# Patient Record
Sex: MALE | Race: WHITE | NOT HISPANIC OR LATINO | URBAN - METROPOLITAN AREA
[De-identification: names, ages, dates, MRNs, and addresses within clinical notes are randomized per-mention and may not be internally consistent; named-entity substitution may affect disease eponyms.]

---

## 2017-10-24 ENCOUNTER — EMERGENCY (EMERGENCY)
Facility: HOSPITAL | Age: 47
LOS: 0 days | Discharge: HOME | End: 2017-10-24
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
## 2017-10-24 DIAGNOSIS — I10 ESSENTIAL (PRIMARY) HYPERTENSION: ICD-10-CM

## 2017-10-24 DIAGNOSIS — M54.5 LOW BACK PAIN: ICD-10-CM

## 2017-10-24 DIAGNOSIS — R42 DIZZINESS AND GIDDINESS: ICD-10-CM

## 2017-10-24 DIAGNOSIS — Z87.891 PERSONAL HISTORY OF NICOTINE DEPENDENCE: ICD-10-CM

## 2017-10-24 DIAGNOSIS — S39.012A STRAIN OF MUSCLE, FASCIA AND TENDON OF LOWER BACK, INITIAL ENCOUNTER: ICD-10-CM
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
## 2017-10-24 DIAGNOSIS — Z79.899 OTHER LONG TERM (CURRENT) DRUG THERAPY: ICD-10-CM

## 2020-11-20 ENCOUNTER — INPATIENT (INPATIENT)
Facility: HOSPITAL | Age: 50
LOS: 13 days | Discharge: HOME | End: 2020-12-04
Attending: PSYCHIATRY & NEUROLOGY | Admitting: PSYCHIATRY & NEUROLOGY
Payer: COMMERCIAL

## 2020-11-20 VITALS
OXYGEN SATURATION: 98 % | SYSTOLIC BLOOD PRESSURE: 136 MMHG | DIASTOLIC BLOOD PRESSURE: 96 MMHG | WEIGHT: 231.93 LBS | HEART RATE: 123 BPM | TEMPERATURE: 98 F | RESPIRATION RATE: 20 BRPM

## 2020-11-20 DIAGNOSIS — I10 ESSENTIAL (PRIMARY) HYPERTENSION: ICD-10-CM

## 2020-11-20 DIAGNOSIS — E78.00 PURE HYPERCHOLESTEROLEMIA, UNSPECIFIED: ICD-10-CM

## 2020-11-20 DIAGNOSIS — F41.9 ANXIETY DISORDER, UNSPECIFIED: ICD-10-CM

## 2020-11-20 DIAGNOSIS — F41.0 PANIC DISORDER [EPISODIC PAROXYSMAL ANXIETY]: ICD-10-CM

## 2020-11-20 DIAGNOSIS — F32.2 MAJOR DEPRESSIVE DISORDER, SINGLE EPISODE, SEVERE WITHOUT PSYCHOTIC FEATURES: ICD-10-CM

## 2020-11-20 LAB
ALBUMIN SERPL ELPH-MCNC: 5 G/DL — SIGNIFICANT CHANGE UP (ref 3.5–5.2)
ALP SERPL-CCNC: 97 U/L — SIGNIFICANT CHANGE UP (ref 30–115)
ALT FLD-CCNC: 40 U/L — SIGNIFICANT CHANGE UP (ref 0–41)
AMPHET UR-MCNC: NEGATIVE — SIGNIFICANT CHANGE UP
ANION GAP SERPL CALC-SCNC: 12 MMOL/L — SIGNIFICANT CHANGE UP (ref 7–14)
APAP SERPL-MCNC: <5 UG/ML — LOW (ref 10–30)
AST SERPL-CCNC: 30 U/L — SIGNIFICANT CHANGE UP (ref 0–41)
BARBITURATES UR SCN-MCNC: NEGATIVE — SIGNIFICANT CHANGE UP
BASOPHILS # BLD AUTO: 0.04 K/UL — SIGNIFICANT CHANGE UP (ref 0–0.2)
BASOPHILS NFR BLD AUTO: 0.3 % — SIGNIFICANT CHANGE UP (ref 0–1)
BENZODIAZ UR-MCNC: POSITIVE
BILIRUB SERPL-MCNC: 1 MG/DL — SIGNIFICANT CHANGE UP (ref 0.2–1.2)
BUN SERPL-MCNC: 21 MG/DL — HIGH (ref 10–20)
CALCIUM SERPL-MCNC: 10 MG/DL — SIGNIFICANT CHANGE UP (ref 8.5–10.1)
CHLORIDE SERPL-SCNC: 106 MMOL/L — SIGNIFICANT CHANGE UP (ref 98–110)
CO2 SERPL-SCNC: 21 MMOL/L — SIGNIFICANT CHANGE UP (ref 17–32)
COCAINE METAB.OTHER UR-MCNC: NEGATIVE — SIGNIFICANT CHANGE UP
CREAT SERPL-MCNC: 1.3 MG/DL — SIGNIFICANT CHANGE UP (ref 0.7–1.5)
DRUG SCREEN 1, URINE RESULT: SIGNIFICANT CHANGE UP
EOSINOPHIL # BLD AUTO: 0.01 K/UL — SIGNIFICANT CHANGE UP (ref 0–0.7)
EOSINOPHIL NFR BLD AUTO: 0.1 % — SIGNIFICANT CHANGE UP (ref 0–8)
ETHANOL SERPL-MCNC: <10 MG/DL — SIGNIFICANT CHANGE UP
GLUCOSE SERPL-MCNC: 118 MG/DL — HIGH (ref 70–99)
HCT VFR BLD CALC: 50.8 % — SIGNIFICANT CHANGE UP (ref 42–52)
HGB BLD-MCNC: 17.8 G/DL — SIGNIFICANT CHANGE UP (ref 14–18)
IMM GRANULOCYTES NFR BLD AUTO: 0.5 % — HIGH (ref 0.1–0.3)
LYMPHOCYTES # BLD AUTO: 1.63 K/UL — SIGNIFICANT CHANGE UP (ref 1.2–3.4)
LYMPHOCYTES # BLD AUTO: 12.9 % — LOW (ref 20.5–51.1)
MCHC RBC-ENTMCNC: 29.7 PG — SIGNIFICANT CHANGE UP (ref 27–31)
MCHC RBC-ENTMCNC: 35 G/DL — SIGNIFICANT CHANGE UP (ref 32–37)
MCV RBC AUTO: 84.7 FL — SIGNIFICANT CHANGE UP (ref 80–94)
METHADONE UR-MCNC: NEGATIVE — SIGNIFICANT CHANGE UP
MONOCYTES # BLD AUTO: 0.61 K/UL — HIGH (ref 0.1–0.6)
MONOCYTES NFR BLD AUTO: 4.8 % — SIGNIFICANT CHANGE UP (ref 1.7–9.3)
NEUTROPHILS # BLD AUTO: 10.24 K/UL — HIGH (ref 1.4–6.5)
NEUTROPHILS NFR BLD AUTO: 81.4 % — HIGH (ref 42.2–75.2)
NRBC # BLD: 0 /100 WBCS — SIGNIFICANT CHANGE UP (ref 0–0)
OPIATES UR-MCNC: NEGATIVE — SIGNIFICANT CHANGE UP
PCP UR-MCNC: NEGATIVE — SIGNIFICANT CHANGE UP
PLATELET # BLD AUTO: 243 K/UL — SIGNIFICANT CHANGE UP (ref 130–400)
POTASSIUM SERPL-MCNC: 4 MMOL/L — SIGNIFICANT CHANGE UP (ref 3.5–5)
POTASSIUM SERPL-SCNC: 4 MMOL/L — SIGNIFICANT CHANGE UP (ref 3.5–5)
PROPOXYPHENE QUALITATIVE URINE RESULT: NEGATIVE — SIGNIFICANT CHANGE UP
PROT SERPL-MCNC: 7.6 G/DL — SIGNIFICANT CHANGE UP (ref 6–8)
RAPID RVP RESULT: SIGNIFICANT CHANGE UP
RBC # BLD: 6 M/UL — SIGNIFICANT CHANGE UP (ref 4.7–6.1)
RBC # FLD: 13.1 % — SIGNIFICANT CHANGE UP (ref 11.5–14.5)
SALICYLATES SERPL-MCNC: <0.3 MG/DL — LOW (ref 4–30)
SARS-COV-2 RNA SPEC QL NAA+PROBE: SIGNIFICANT CHANGE UP
SODIUM SERPL-SCNC: 139 MMOL/L — SIGNIFICANT CHANGE UP (ref 135–146)
THC UR QL: NEGATIVE — SIGNIFICANT CHANGE UP
WBC # BLD: 12.59 K/UL — HIGH (ref 4.8–10.8)
WBC # FLD AUTO: 12.59 K/UL — HIGH (ref 4.8–10.8)

## 2020-11-20 PROCEDURE — 99285 EMERGENCY DEPT VISIT HI MDM: CPT

## 2020-11-20 PROCEDURE — 90792 PSYCH DIAG EVAL W/MED SRVCS: CPT | Mod: 95

## 2020-11-20 RX ORDER — LISINOPRIL 2.5 MG/1
40 TABLET ORAL ONCE
Refills: 0 | Status: COMPLETED | OUTPATIENT
Start: 2020-11-20 | End: 2020-11-20

## 2020-11-20 RX ORDER — ACETAMINOPHEN 500 MG
650 TABLET ORAL EVERY 4 HOURS
Refills: 0 | Status: DISCONTINUED | OUTPATIENT
Start: 2020-11-20 | End: 2020-12-04

## 2020-11-20 RX ORDER — IBUPROFEN 200 MG
400 TABLET ORAL EVERY 8 HOURS
Refills: 0 | Status: DISCONTINUED | OUTPATIENT
Start: 2020-11-20 | End: 2020-12-04

## 2020-11-20 RX ORDER — QUETIAPINE FUMARATE 200 MG/1
50 TABLET, FILM COATED ORAL AT BEDTIME
Refills: 0 | Status: DISCONTINUED | OUTPATIENT
Start: 2020-11-20 | End: 2020-11-21

## 2020-11-20 RX ORDER — CLONAZEPAM 1 MG
1 TABLET ORAL THREE TIMES A DAY
Refills: 0 | Status: DISCONTINUED | OUTPATIENT
Start: 2020-11-20 | End: 2020-11-24

## 2020-11-20 RX ORDER — HALOPERIDOL DECANOATE 100 MG/ML
5 INJECTION INTRAMUSCULAR EVERY 8 HOURS
Refills: 0 | Status: DISCONTINUED | OUTPATIENT
Start: 2020-11-20 | End: 2020-12-04

## 2020-11-20 RX ORDER — DIPHENHYDRAMINE HCL 50 MG
50 CAPSULE ORAL ONCE
Refills: 0 | Status: DISCONTINUED | OUTPATIENT
Start: 2020-11-20 | End: 2020-12-04

## 2020-11-20 RX ORDER — ATORVASTATIN CALCIUM 80 MG/1
40 TABLET, FILM COATED ORAL AT BEDTIME
Refills: 0 | Status: DISCONTINUED | OUTPATIENT
Start: 2020-11-20 | End: 2020-12-04

## 2020-11-20 RX ORDER — HALOPERIDOL DECANOATE 100 MG/ML
5 INJECTION INTRAMUSCULAR ONCE
Refills: 0 | Status: DISCONTINUED | OUTPATIENT
Start: 2020-11-20 | End: 2020-12-04

## 2020-11-20 RX ORDER — QUETIAPINE FUMARATE 200 MG/1
25 TABLET, FILM COATED ORAL EVERY 8 HOURS
Refills: 0 | Status: DISCONTINUED | OUTPATIENT
Start: 2020-11-20 | End: 2020-12-04

## 2020-11-20 RX ADMIN — Medication 1 MILLIGRAM(S): at 22:54

## 2020-11-20 RX ADMIN — LISINOPRIL 40 MILLIGRAM(S): 2.5 TABLET ORAL at 21:49

## 2020-11-20 RX ADMIN — QUETIAPINE FUMARATE 50 MILLIGRAM(S): 200 TABLET, FILM COATED ORAL at 22:54

## 2020-11-20 RX ADMIN — ATORVASTATIN CALCIUM 40 MILLIGRAM(S): 80 TABLET, FILM COATED ORAL at 22:54

## 2020-11-20 NOTE — ED PROVIDER NOTE - PHYSICAL EXAMINATION
CONST: Pt appears anxious  EYES: Sclera and conjunctiva clear.   ENT: No nasal discharge. Oropharynx normal appearing, no erythema or exudates  NECK: Non-tender, no meningeal signs. normal ROM. supple   CARD: S1 S2; No jvd  RESP: Equal BS B/L, No wheezes, rhonchi or rales. No distress  GI: Soft, non-tender, non-distended. no cva tenderness. normal BS  MS: Normal ROM in all extremities. pulses 2 +. no calf tenderness or swelling  SKIN: Warm, dry, no acute rashes. Good turgor  NEURO: A&Ox3, No focal deficits. Strength 5/5 with no sensory deficits.

## 2020-11-20 NOTE — H&P ADULT - NSHPLABSRESULTS_GEN_ALL_CORE
17.8   12.59 )-----------( 243      ( 20 Nov 2020 15:15 )             50.8       11-20    139  |  106  |  21<H>  ----------------------------<  118<H>  4.0   |  21  |  1.3    Ca    10.0      20 Nov 2020 15:15    TPro  7.6  /  Alb  5.0  /  TBili  1.0  /  DBili  x   /  AST  30  /  ALT  40  /  AlkPhos  97  11-20                      Lactate Trend            CAPILLARY BLOOD GLUCOSE

## 2020-11-20 NOTE — ED BEHAVIORAL HEALTH ASSESSMENT NOTE - OTHER PAST PSYCHIATRIC HISTORY (INCLUDE DETAILS REGARDING ONSET, COURSE OF ILLNESS, INPATIENT/OUTPATIENT TREATMENT)
two prior suicide attempts and psychiatric hospitalizations, one in the 1990's and one in 2010    currently sees dr brown (psychiatrist) and dr sanchez (therapist)

## 2020-11-20 NOTE — ED BEHAVIORAL HEALTH ASSESSMENT NOTE - SUBSTANCE USE
C/o redness to both eyes onset yesterday with \"goopy\" drainage.    Denies feeling of FB       None known

## 2020-11-20 NOTE — ED BEHAVIORAL HEALTH ASSESSMENT NOTE - HPI (INCLUDE ILLNESS QUALITY, SEVERITY, DURATION, TIMING, CONTEXT, MODIFYING FACTORS, ASSOCIATED SIGNS AND SYMPTOMS)
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   Patient appears quite anxious on exam . he states he was at his usual state of health until the week of the election, when anxiety surrounding the election and a work project led to him "losing it," he has had severe panic attacks daily, with depression, poor/no sleep, inability to concentrate, hopelessness, guilty feelings that he is letting his family down, and passive suicidal ideation. he has two prior suicide attempts, by ingesting pills and drinking [employer] fluid. pt unalbe to contract for safety on interview, he fears he is not safe.  denies manic/psychotic sx s or substance abuse.  see  note for collateral from wife: she says pt has not been at his baseline, severely anxious and depressed and unable to function, she is quite concerned   reports he sees a dr sanchez (therapist) and dr brown , psychiatrist. takes seroque 400mg daily, and klonopin recently increased to 3mg daily  ISTOP  11/18/2020 11/18/2020 clonazepam 1 mg tablet  90 30 Paradise Silver MD [facility] [id number]   10/14/2020 10/22/2020 clonazepam 1 mg tablet  60 30 Paradise Silver MD [facility] [id number]

## 2020-11-20 NOTE — ED BEHAVIORAL HEALTH ASSESSMENT NOTE - DETAILS
two children, twins age 12 two prior suicide attempts. current passive suicidal ideation maternal aunt - suicide attempt Mother - alzheimers father - etoh self d/w ED provider

## 2020-11-20 NOTE — ED BEHAVIORAL HEALTH NOTE - BEHAVIORAL HEALTH NOTE
pt reassessed by telepsychiatry    pt presents anxious but cooperative. he states that he feels somewhat less anxious since being in the ER. he confirmed hx that he shared with previous provider. stated that the election was a trigger for his anxiety and it just spiraled. he states that he would become confused at times and think that he didn't have insurance for his family and then feel guilty that he was not providing for them.   he denies any AVH and any paranoid delusions.   he at this time he denies any active or passive SI. no aggressive thoughts.      Plan  9.13 admission to [facility]  no need for 1:1  meds: he confirmed home medications- still discrepancy between the dose of seroquel. agree with other psychiatrist to give low dose for tonight until dose can  be confirmed in the morning with pharmacy or his psychiatrist. will continue klonopin but space it out to 1mg TID to manage daytime anxiety (pt takes all 3 mg at night). seroquel 25mg can be used as a PRN for now since pt reports taking higher doses. for acute agitation can give PRNS: haldol 5mg, ativan 2mg, diphenhydramine 50mg, PO/IM, Q6H for Agitation   in terms of medical meds: cont current meds benazepril 40mg qd; atorvastatin 40mg qd. /93 so discussed with EM attending ot give BP med now and recheck VS prior to going up to the unit. pt reassessed by telepsychiatry    pt presents anxious but cooperative. he states that he feels somewhat less anxious since being in the ER. he confirmed hx that he shared with previous provider. stated that the election was a trigger for his anxiety and it just spiraled. he states that he would become confused at times and think that he didn't have insurance for his family and then feel guilty that he was not providing for them.   he denies any AVH and any paranoid delusions.   he at this time he denies any active or passive SI. no aggressive thoughts.      Plan  9.13 admission to [facility]  no need for 1:1  meds: he confirmed home medications- still discrepancy between the dose of seroquel. agree with other psychiatrist to give low dose for tonight until dose can  be confirmed in the morning with pharmacy or his psychiatrist. will continue klonopin but space it out to 1mg TID to manage daytime anxiety (pt takes all 3 mg at night). seroquel 25mg can be used as a PRN for now since pt reports taking higher doses. for acute agitation can give PRNS: haldol 5mg, ativan 2mg, diphenhydramine 50mg, PO/IM, Q6H for Agitation   in terms of medical meds: cont current meds benazepril 40mg qd; atorvastatin 40mg qd. /93 so discussed with EM attending ot give BP med now and recheck VS prior to going up to the unit.  handoff given to BASHIR Gustafson

## 2020-11-20 NOTE — ED BEHAVIORAL HEALTH ASSESSMENT NOTE - RISK ASSESSMENT
high acute risk: severely depressed, hopeless, not sleeping, with passive suicidal ideation   high chronic risk: two prior suicide attempts High Acute Suicide Risk

## 2020-11-20 NOTE — ED BEHAVIORAL HEALTH ASSESSMENT NOTE - SUMMARY
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  pt is severely depressed and anxious, not functioning, not sleeping, with passive suicidal ideation, he has two prior suicide attempts, he is high risk of self harm and would benefit from inpatient hospitalization for safety and stabilization.

## 2020-11-20 NOTE — ED BEHAVIORAL HEALTH ASSESSMENT NOTE - DESCRIPTION
see bh note     COVID Exposure Screen- Patient    1.	*In the past 14 days, have you been around anyone with a positive COVID-19 test?*   (  ) Yes   ( x ) No   (  ) Unknown- Reason (e.g. patient uncertain, sedated, refusing to answer, etc.):  ______  IF YES PROCEED TO QUESTION #2. IF NO or UNKNOWN, PLEASE SKIP TO QUESTION #7  2.	Were you within 6 feet of them for at least 15 minutes? (  ) Yes   (  ) No   (  ) Unknown- Reason: ______    3.	Have you provided care for them? (  ) Yes   (  ) No   (  ) Unknown- Reason: ______    4.	Have you had direct physical contact with them (touched, hugged, or kissed them)?  (  ) Yes   (  ) No    (  ) Unknown- Reason: ______    5.	Have you shared eating or drinking utensils with them? (  ) Yes   (  ) No    (  ) Unknown- Reason: ______    6.	Have they sneezed, coughed, or somehow got respiratory droplets on you? (  ) Yes   (  ) No    (  ) Unknown- Reason: ______      7.	*Have you been out of [place] within the past 14 days?*  (  ) Yes   ( x ) No   (  ) Unknown- Reason (e.g. patient uncertain, sedated, refusing to answer, etc.): _______  IF YES PLEASE ANSWER THE FOLLOWING QUESTIONS:  8.	Which state/country have you been to? ______   9.	Were you there over 24 hours? (  ) Yes   (  ) No    (  ) Unknown- Reason: ______    10.	What date did you return to [facility]? 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

## 2020-11-20 NOTE — ED PROVIDER NOTE - OBJECTIVE STATEMENT
50y M , [facility] as listed presents for eval of anxiety. 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 Pt takes seroqul and klonopin with minimal improvement. Denies si/hi, hallucinations, cp, sob, weakness, numbness, n/v/d/c

## 2020-11-20 NOTE — H&P ADULT - ASSESSMENT
49 y/o male presents anxious but cooperative. he states that he feels somewhat less anxious now  in IPP . He confirmed hx that he shared with previous provider. stated that the election was a trigger for his anxiety and it just spiraled. He states that he would become confused at times and think that he didn't have insurance for his family and then feel guilty that he was not providing for them.     Last week pt spoke to Psychiatrist, and this week Psychiatrist increased pt’s dose of Seroquel and Klonopin. This week in the context of anxiety pt has been stuttering and at times is unable to articulate thoughts. Pt has been rocking in a corner and shaking.

## 2020-11-20 NOTE — ED PROVIDER NOTE - NS ED ROS FT
Constitutional: (-) fever  Eyes/ENT: (-) blurry vision, (-) epistaxis  Cardiovascular: (-) chest pain, (-) syncope  Respiratory: (-) cough, (-) shortness of breath  Gastrointestinal: (-) vomiting, (-) diarrhea  Musculoskeletal: (-) neck pain, (-) back pain, (-) joint pain  Integumentary: (-) rash, (-) edema  Neurological: (-) headache, (-) altered mental status  Psychiatric: (+) anxiety, (-) si/hi, (-) hallucinations  Allergic/Immunologic: (-) pruritus

## 2020-11-20 NOTE — ED BEHAVIORAL HEALTH ASSESSMENT NOTE - PSYCHIATRIC ISSUES AND PLAN (INCLUDE STANDING AND PRN MEDICATION)
cont klonopin 1mg tid; cont seroquel (unclear dose, pt states 400mg but med list states 50mg, would order 50mg qhs for now)

## 2020-11-20 NOTE — ED BEHAVIORAL HEALTH ASSESSMENT NOTE - SUICIDE RISK FACTORS
Anhedonia/Current mood episode/Agitation/Severe Anxiety/Panic/Insomnia/Mood Disorder current/past/Hopelessness or despair

## 2020-11-20 NOTE — ED BEHAVIORAL HEALTH NOTE - BEHAVIORAL HEALTH NOTE
===================  PRE-HOSPITAL COURSE  ===================  SOURCE: ED Documentation, ED RN   DETAILS: Pt was a walk-in    ============  ED COURSE   ============  SOURCE: ED Documentation, ED RN  ARRIVAL: Pt was a walk-in   BELONGINGS: Security secured glasses, wallet; pt provided pill bottles which were sent to Pharmacy   BEHAVIOR: Pt presented with complaint of anxiety, and has been reiterating the details about current stressors. Pt denied SI/HI and denied AH/VH. Pt was agreeable to hospital protocol including changing into a gown and having labs drawn and belongings secured. Pt did not require involuntary medication or security intervention. Eye contact is good initially, but as pt spoke about stressors and feeling overwhelmed pt’s gaze shifted downwards. Pt presents as anxious in ED. Hygiene and grooming is good.   TREATMENT: None  VISITORS: None (Wife dropped pt off at ED and due to COVID regulations returned home)      ========================  FOR EACH COLLATERAL  ========================  NAME: Mariya   NUMBER: Lokesh Whalen preferred- [phone]; Cell [phone]   RELATIONSHIP: Wife  RELIABILITY: Knowledgeable about pt history and presenting issues       HPI  BASELINE FUNCTIONING: Pt is  and domiciled with Wife and two children age 12. Pt has a great relationship with children and is very involved in their lives. 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 Pt has baseline anxiety with hx of panic attacks, which he is typically able to manage with medication and outpatient psychiatric treatment.    DATE HPI STARTED: About 2.5 weeks ago  DECOMPENSATION: Pt and Dad have different political views, and have been arguing about their viewpoints leading up to the election. Dad uses Alcohol and frequently calls pt while intoxicated and yells at pt, at times in an incoherent manner. Pt’s Mom is dx with Alzheimer’s and has been in a Nursing Home, and pt has been unable to visit Mom since March due to COVID pandemic. Pt has face-timed a few times, and Mom hasn’t been able to recognize pt. A few weeks ago pt’s 11yo daughter was exposed to someone who tested positive for COVID, and pt and family quarantined at home and got tested for COVID, and the family members all tested negative. Over the past 2.5 weeks pt has had more frequent and more severe panic attacks. Pt has been having difficulty sleeping and in an attempt to compensate for lack of sleep, pt has been drinking a high quantity of caffeinated beverages. Pt has had decreased appetite and difficulty focusing. Pt has felt overwhelmed and flustered by his job tasks, which are tasks that pt has performed many times in his career.  Last week pt spoke to Psychiatrist, and this week Psychiatrist increased pt’s dose of Seroquel and Klonopin. This week in the context of anxiety pt has been stuttering and at times is unable to articulate thoughts. Pt has been rocking in a corner and shaking. At times pt has felt like he couldn’t move his arms, and described feeling as though the top of his head was hot.  Wife called Psychiatrist today and left a message. Pt at times appeared that he couldn’t move his arms, and felt like when he had to do a screenshare on zoom, it was difficult to move his arms. Pt stated that the top of his head felt hot. Wife denies psychotic symptoms, aggression/violence, substance use, recent SI, self injury or suicide attempt, or access to guns/weapons. Pt went willingly with Wife to ED today. Wife is in support of pt being admitted as she states that pt is far from his baseline and is presenting with the worst anxiety she has observed since 2011 when pt was hospitalized.    SUICIDALITY: No recent SI, but pt has reported feeling overwhelmed and wanting to seek help in order to not decompensate further   VIOLENCE: None  SUBSTANCE: None    ========================  PAST PSYCHIATRIC HISTORY  ========================  DATE PAST PSYCHIATRIC HISTORY STARTED: Extensive hx of anxiety for many years and remote hx of SI about 10 years ago  MAIN PSYCHIATRIC DIAGNOSIS: Anxiety  PSYCHIATRIC HOSPITALIZATIONS: Two Psychiatric hospitalizations. In November 2010 pt was admitted medically at [facility] after a serious Suicide Attempt when pt overdosed on a few hundred pills of Xanax, and ended up in a coma. Pt was later transferred to a rehab facility to regain strength after a long hospitalization, and then was admitted to [facility] inpatient [facility] unit for about 2 weeks.    PRIOR ILLNESS: After pt’s hospitalization for anxiety in 2011, pt has been treated in an outpatient setting by a Psychiatrist and therapist, and has been adherent to medication over the years.   SUICIDALITY: 2 suicide attempts, most recently in November 2010 after overdosing on close to 250 Xanax pills, leading to coma and medical admission to [facility], rehabilitation, and subsequently a 2 week hospitalization at [facility]   VIOLENCE: No  SUBSTANCE USE: None      ==============  OTHER HISTORY  ==============  CURRENT MEDICATION: Per ED Documentation   MEDICAL HISTORY: High Cholesterol, possibly High Blood Pressure  ALLERGIES: No  LEGAL ISSUES: No  FIREARM ACCESS: No  SOCIAL HISTORY: Hx of Dad with Alcohol Use and verbal abuse since childhood. Pt is employed as a teacher and due to COVID pandemic began teaching remote, and has had to get used to the technology that is used.   FAMILY HISTORY: Dad- Alcohol Use, Maternal Aunt- hx of Suicide Attempts, Paternal Aunt- Bipolar Disorder, Mom- Alzheimer’s Disease   DEVELOPMENTAL HISTORY: Unknown    COVID Exposure Screen- collateral (i.e. third-party, chart review, belongings, etc; include EMS and ED staff)    1.	*In the past 14 days, has the patient been around anyone with a positive COVID-19 test?*   (  ) Yes   ( X ) No   (  ) Unknown- Reason (e.g. collateral uncertain, refusing to answer, etc.):  ______  IF YES PROCEED TO QUESTION #2. IF NO or UNKNOWN, PLEASE SKIP TO QUESTION #7  2.	Was the patient within 6 feet of them for at least 15 minutes? (  ) Yes   (  ) No   (  ) Unknown- Reason: ______    3.	Did the patient provide care for them? (  ) Yes   (  ) No   (  ) Unknown- Reason: ______    4.	Did the patient have direct physical contact with them (touched, hugged, or kissed them)? (  ) Yes   (  ) No    (  ) Unknown- Reason: ______    5.	Did the patient share eating or drinking utensils with them? (  ) Yes   (  ) No    (  ) Unknown- Reason: ______    6.	Have they sneezed, coughed, or somehow got respiratory droplets on the patient? (  ) Yes   (  ) No    (  ) Unknown- Reason: ______    7.	*Has the patient been out of [place] within the past 14 days?*  (  ) Yes   ( X) No   (  ) Unknown- Reason (e.g. collateral uncertain, refusing to answer, etc.): _______  IF YES PLEASE ANSWER THE FOLLOWING QUESTIONS:  8.	Which state/country has the patient been to? ______   9.	Was the patient there over 24 hours? (  ) Yes   (  ) No    (  ) Unknown- Reason: ______    10.	What date did the patient return to [facility]? ______

## 2020-11-21 DIAGNOSIS — I10 ESSENTIAL (PRIMARY) HYPERTENSION: ICD-10-CM

## 2020-11-21 DIAGNOSIS — F33.1 MAJOR DEPRESSIVE DISORDER, RECURRENT, MODERATE: ICD-10-CM

## 2020-11-21 LAB
CHOLEST SERPL-MCNC: 153 MG/DL — SIGNIFICANT CHANGE UP
HDLC SERPL-MCNC: 44 MG/DL — SIGNIFICANT CHANGE UP
LIPID PNL WITH DIRECT LDL SERPL: 93 MG/DL — SIGNIFICANT CHANGE UP
NON HDL CHOLESTEROL: 109 MG/DL — SIGNIFICANT CHANGE UP
TRIGL SERPL-MCNC: 98 MG/DL — SIGNIFICANT CHANGE UP

## 2020-11-21 PROCEDURE — 99253 IP/OBS CNSLTJ NEW/EST LOW 45: CPT

## 2020-11-21 PROCEDURE — 99231 SBSQ HOSP IP/OBS SF/LOW 25: CPT

## 2020-11-21 RX ORDER — INFLUENZA VIRUS VACCINE 15; 15; 15; 15 UG/.5ML; UG/.5ML; UG/.5ML; UG/.5ML
0.5 SUSPENSION INTRAMUSCULAR ONCE
Refills: 0 | Status: COMPLETED | OUTPATIENT
Start: 2020-11-21 | End: 2020-12-04

## 2020-11-21 RX ORDER — QUETIAPINE FUMARATE 200 MG/1
100 TABLET, FILM COATED ORAL AT BEDTIME
Refills: 0 | Status: DISCONTINUED | OUTPATIENT
Start: 2020-11-21 | End: 2020-11-22

## 2020-11-21 RX ORDER — LISINOPRIL 2.5 MG/1
40 TABLET ORAL DAILY
Refills: 0 | Status: DISCONTINUED | OUTPATIENT
Start: 2020-11-21 | End: 2020-12-04

## 2020-11-21 RX ORDER — DULOXETINE HYDROCHLORIDE 30 MG/1
90 CAPSULE, DELAYED RELEASE ORAL DAILY
Refills: 0 | Status: DISCONTINUED | OUTPATIENT
Start: 2020-11-21 | End: 2020-12-04

## 2020-11-21 RX ORDER — HYDROXYZINE HCL 10 MG
50 TABLET ORAL EVERY 6 HOURS
Refills: 0 | Status: DISCONTINUED | OUTPATIENT
Start: 2020-11-21 | End: 2020-12-04

## 2020-11-21 RX ADMIN — QUETIAPINE FUMARATE 25 MILLIGRAM(S): 200 TABLET, FILM COATED ORAL at 00:06

## 2020-11-21 RX ADMIN — Medication 1 MILLIGRAM(S): at 20:13

## 2020-11-21 RX ADMIN — QUETIAPINE FUMARATE 100 MILLIGRAM(S): 200 TABLET, FILM COATED ORAL at 20:13

## 2020-11-21 RX ADMIN — HALOPERIDOL DECANOATE 5 MILLIGRAM(S): 100 INJECTION INTRAMUSCULAR at 11:10

## 2020-11-21 RX ADMIN — ATORVASTATIN CALCIUM 40 MILLIGRAM(S): 80 TABLET, FILM COATED ORAL at 20:13

## 2020-11-21 RX ADMIN — Medication 50 MILLIGRAM(S): at 11:09

## 2020-11-21 RX ADMIN — Medication 50 MILLIGRAM(S): at 20:14

## 2020-11-21 RX ADMIN — DULOXETINE HYDROCHLORIDE 90 MILLIGRAM(S): 30 CAPSULE, DELAYED RELEASE ORAL at 14:07

## 2020-11-21 RX ADMIN — Medication 1 MILLIGRAM(S): at 12:35

## 2020-11-21 NOTE — PROGRESS NOTE BEHAVIORAL HEALTH - SUMMARY
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    Anxiety/depression: confirmed with the pharmacy ([facility], [phone]) that Pt has been on duloxetive 90mg daily, Klonopin 1mg TID. Will restart duloxetine 90mg daily. Cont Klonopin. Continue Seroquel 50mg HS     Vistaril 50mg q6h PRN and Seroquel 25mg for breakthrough anxiety.    HTN - lisinopril 40mg qd

## 2020-11-21 NOTE — CHART NOTE - NSCHARTNOTEFT_GEN_A_CORE
I was called by Pts psychiatrist Dr. Medeiros, who suggested to observe Pt more thoroughly due to h/o very serious SA in the past and possibly current SI. I called nurses on IPP and conveyed this information to them. As per Dr Medeiros's suggestion, I also increased Pts Seroquel to 100mg HS.

## 2020-11-21 NOTE — PROGRESS NOTE BEHAVIORAL HEALTH - NSBHFUPINTERVALHXFT_PSY_A_CORE
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   As per nurses report, Pt has been without acute behavioral issues, adherent to treatment, ate breakfast this morning. Pt reports poor sleep last night and feeling tired and anxious this AM. He denies SI but reports a lot of guilt "for leaving my family and disrupting everybody's life". No overt psychotic Sx appreciated.

## 2020-11-21 NOTE — PROGRESS NOTE BEHAVIORAL HEALTH - PRN MEDS
Pt received Seroquel 25mg HS PRN last night  Vistaril 50mg q6h PRN anxiety ordered - Pt received one dose in AM.

## 2020-11-21 NOTE — PROGRESS NOTE BEHAVIORAL HEALTH - NSBHCHARTREVIEWLAB_PSY_A_CORE FT
17.8   12.59 )-----------( 243      ( 20 Nov 2020 15:15 )             50.8   11-20    139  |  106  |  21<H>  ----------------------------<  118<H>  4.0   |  21  |  1.3    Ca    10.0      20 Nov 2020 15:15        TPro  7.6  /  Alb  5.0  /  TBili  1.0  /  DBili  x   /  AST  30  /  ALT  40  /  AlkPhos  97  11-20

## 2020-11-21 NOTE — PROGRESS NOTE BEHAVIORAL HEALTH - NSBHCHARTREVIEWINVESTIGATE_PSY_A_CORE FT
Ventricular Rate 94 BPM    Atrial Rate 94 BPM    P-R Interval 138 ms    QRS Duration 86 ms    Q-T Interval 316 ms    QTC Calculation(Bazett) 395 ms    P Axis 53 degrees    R Axis 38 degrees    T Axis 85 degrees    Diagnosis Line Sinus rhythm with Premature supraventricular complexes  Poor R wave progression    Confirmed by DEBBIE STORY MD ([id number]) on 11/21/2020 9:52:04 AM

## 2020-11-21 NOTE — CONSULT NOTE ADULT - SUBJECTIVE AND OBJECTIVE BOX
51 y/o male with PMH HTN and HLD presents to [facility] for IPP admission.    11/21:  Patient seen at bedside, denies any acute complaints.          REVIEW OF SYSTEMS:  CONSTITUTIONAL: No fever, weight loss, or fatigue  EYES: No eye pain, visual disturbances, or discharge  ENMT:  No difficulty hearing, tinnitus, vertigo; No sinus or throat pain  NECK: No pain or stiffness  RESPIRATORY: No cough, wheezing, chills or hemoptysis; No shortness of breath  CARDIOVASCULAR: No chest pain, palpitations, dizziness, or leg swelling  GASTROINTESTINAL: No abdominal or epigastric pain. No nausea, vomiting, or hematemesis; No diarrhea or constipation. No melena or hematochezia.  GENITOURINARY: No dysuria, frequency, hematuria, or incontinence  NEUROLOGICAL: No headaches, memory loss, loss of strength, numbness, or tremors  SKIN: No itching, burning, rashes, or lesions   LYMPH NODES: No enlarged glands  ENDOCRINE: No heat or cold intolerance; No hair loss  MUSCULOSKELETAL: No joint pain or swelling; No muscle, back, or extremity pain  PSYCHIATRIC: No SI/HI  HEME/LYMPH: No easy bruising, or bleeding gums  ALLERGY AND IMMUNOLOGIC: No hives or eczema      MEDICATIONS  (STANDING):  atorvastatin 40 milliGRAM(s) Oral at bedtime  clonazePAM  Tablet 1 milliGRAM(s) Oral three times a day  influenza   Vaccine 0.5 milliLiter(s) IntraMuscular once  QUEtiapine 50 milliGRAM(s) Oral at bedtime    MEDICATIONS  (PRN):  acetaminophen   Tablet .. 650 milliGRAM(s) Oral every 4 hours PRN Mild Pain (1 - 3)  aluminum hydroxide/magnesium hydroxide/simethicone Suspension 30 milliLiter(s) Oral every 4 hours PRN Dyspepsia  diphenhydrAMINE   Injectable 50 milliGRAM(s) IntraMuscular once PRN Agitation  haloperidol     Tablet 5 milliGRAM(s) Oral every 8 hours PRN agitation  haloperidol    Injectable 5 milliGRAM(s) IntraMuscular once PRN Agitation  hydrOXYzine hydrochloride 50 milliGRAM(s) Oral every 6 hours PRN anxiety  ibuprofen  Tablet. 400 milliGRAM(s) Oral every 8 hours PRN Moderate Pain (4 - 6)  LORazepam   Injectable 2 milliGRAM(s) IntraMuscular once PRN Agitation  QUEtiapine 25 milliGRAM(s) Oral every 8 hours PRN anxiety      Allergies  No Known Allergies            SOCIAL HISTORY: No tobacco, illicit drugs, ETOH abuse    FAMILY HISTORY:  CAD father (43)      Vital Signs Last 24 Hrs  T(C): 36.4 (21 Nov 2020 08:46), Max: 36.4 (20 Nov 2020 14:33)  T(F): 97.5 (21 Nov 2020 08:46), Max: 97.6 (20 Nov 2020 17:09)  HR: 85 (21 Nov 2020 08:46) (85 - 123)  BP: 149/97 (21 Nov 2020 08:46) (136/96 - 173/95)  RR: 18 (21 Nov 2020 08:46) (18 - 20)  SpO2: 98% (20 Nov 2020 20:04) (98% - 98%)    PHYSICAL EXAM:  GENERAL: NAD, well-groomed, well-developed  HEAD:  Atraumatic, Normocephalic  EYES: EOMI, PERRLA, conjunctiva and sclera clear  ENMT: No tonsillar erythema, exudates, or enlargement; Moist mucous membranes, Good dentition, No lesions  NECK: Supple, No JVD, Normal thyroid  NERVOUS SYSTEM:  Alert & Oriented X3, Good concentration; Motor Strength 5/5 B/L upper and lower extremities  CHEST/LUNG: Clear to percussion bilaterally; No rales, rhonchi, wheezing, or rubs  HEART: Regular rate and rhythm; No murmurs, rubs, or gallops  ABDOMEN: Soft, Nontender, Nondistended; Bowel sounds present  EXTREMITIES:  2+ Peripheral Pulses, No clubbing, cyanosis, or edema  SKIN: No rashes or lesions  PSYCH: appropriate mood and behavior      LABS:                        17.8   12.59 )-----------( 243      ( 20 Nov 2020 15:15 )             50.8     11-20    139  |  106  |  21<H>  ----------------------------<  118<H>  4.0   |  21  |  1.3    Ca    10.0      20 Nov 2020 15:15    TPro  7.6  /  Alb  5.0  /  TBili  1.0  /  DBili  x   /  AST  30  /  ALT  40  /  AlkPhos  97  11-20

## 2020-11-22 LAB
SARS-COV-2 IGG SERPL QL IA: NEGATIVE — SIGNIFICANT CHANGE UP
SARS-COV-2 IGM SERPL IA-ACNC: 0.12 INDEX — SIGNIFICANT CHANGE UP

## 2020-11-22 PROCEDURE — 99231 SBSQ HOSP IP/OBS SF/LOW 25: CPT

## 2020-11-22 RX ORDER — QUETIAPINE FUMARATE 200 MG/1
200 TABLET, FILM COATED ORAL AT BEDTIME
Refills: 0 | Status: DISCONTINUED | OUTPATIENT
Start: 2020-11-22 | End: 2020-11-24

## 2020-11-22 RX ADMIN — LISINOPRIL 40 MILLIGRAM(S): 2.5 TABLET ORAL at 08:37

## 2020-11-22 RX ADMIN — QUETIAPINE FUMARATE 200 MILLIGRAM(S): 200 TABLET, FILM COATED ORAL at 20:21

## 2020-11-22 RX ADMIN — Medication 1 MILLIGRAM(S): at 06:07

## 2020-11-22 RX ADMIN — Medication 1 MILLIGRAM(S): at 14:04

## 2020-11-22 RX ADMIN — Medication 1 MILLIGRAM(S): at 20:22

## 2020-11-22 RX ADMIN — ATORVASTATIN CALCIUM 40 MILLIGRAM(S): 80 TABLET, FILM COATED ORAL at 20:22

## 2020-11-22 RX ADMIN — DULOXETINE HYDROCHLORIDE 90 MILLIGRAM(S): 30 CAPSULE, DELAYED RELEASE ORAL at 08:37

## 2020-11-22 NOTE — PROGRESS NOTE BEHAVIORAL HEALTH - NSBHCHARTREVIEWINVESTIGATE_PSY_A_CORE FT
Ventricular Rate 94 BPM    Atrial Rate 94 BPM    P-R Interval 138 ms    QRS Duration 86 ms    Q-T Interval 316 ms    QTC Calculation(Bazett) 395 ms    P Axis 53 degrees    R Axis 38 degrees    T Axis 85 degrees    Diagnosis Line Sinus rhythm with Premature supraventricular complexes  Poor R wave progression    Confirmed by DEBBIE STORY MD ([id number]) on 11/21/2020 9:52:04 AM

## 2020-11-22 NOTE — PROGRESS NOTE BEHAVIORAL HEALTH - NSBHFUPINTERVALHXFT_PSY_A_CORE
Patient reports he has been sleeping better, feels less anxious, and overall more hopeful. He states he is trying to engage in groups on the unit, is more social, and looking forward to returning back to his family. No overt psychotic Sx appreciated. Denies any SI/HI. No other complaints, keeping in touch with his wife.

## 2020-11-22 NOTE — PROGRESS NOTE BEHAVIORAL HEALTH - SUMMARY
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    Anxiety/depression: (Meds were confirmed with the pharmacy [facility] at [facility], [phone] on 11/21/20)  -Continue Duloxetine 90mg daily  -Klonopin 1mg TID  -Increase Seroquel from 100mg to 200 HS (home dose is 400mg)     Vistaril 50mg q6h PRN and Seroquel 25mg for breakthrough anxiety.    HTN - lisinopril 40mg qd

## 2020-11-23 PROCEDURE — 99231 SBSQ HOSP IP/OBS SF/LOW 25: CPT

## 2020-11-23 RX ADMIN — LISINOPRIL 40 MILLIGRAM(S): 2.5 TABLET ORAL at 08:45

## 2020-11-23 RX ADMIN — Medication 1 MILLIGRAM(S): at 15:03

## 2020-11-23 RX ADMIN — Medication 1 MILLIGRAM(S): at 06:42

## 2020-11-23 RX ADMIN — DULOXETINE HYDROCHLORIDE 90 MILLIGRAM(S): 30 CAPSULE, DELAYED RELEASE ORAL at 08:44

## 2020-11-23 NOTE — CHART NOTE - NSCHARTNOTEFT_GEN_A_CORE
Mr. Henry remains on the unit for continued treatment, safety and observation.  met with patient to establish rapport and encourage participation in unit groups. Mr. Henry was pleasant and open to interview. He was open to discussing the details of his admission including experiencing anxiety and panic attacks. 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  listened, providing empathy, support and validation.      reviewed some coping skills Mr. Henry can use in the event of a panic attack, such as taking a break from a remote teaching lesson until his panic attacks dissipates and deep breathing.  encouraged patient to attend groups on the unit to decrease isolation and to review more skills that might help him cope. Mr. Henry Mr. Henry remains on the unit for continued treatment, safety and observation.  met with patient to establish rapport and encourage participation in unit groups. Mr. Henry was pleasant and open to interview. He was open to discussing the details of his admission including experiencing anxiety and panic attacks. 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  listened, providing empathy, support and validation.      reviewed some coping skills Mr. Henry can use in the event of a panic attack, such as taking a break from a remote teaching lesson until his panic attack dissipates and deep breathing.  encouraged patient to attend groups on the unit to decrease isolation and to learn more skills that might help him cope. Mr. Henry was able to attend the evening recovery group with peers, where he was quiet but focused. He did not express any additional concerns.      will continue to meet with Mr. Henry for education, support and assistance with planning for discharge. He will continue to be encouraged to attend groups on the unit, until discharge.

## 2020-11-23 NOTE — PROGRESS NOTE BEHAVIORAL HEALTH - NSBHFUPINTERVALHXFT_PSY_A_CORE
Patient was seen at beside with treatment team. The patient reports feeling drowsy due to receiving Klonopin this morning. He states that he came to the hospital as a result of high anxiety surrounding the election and generally not doing well.     Per MS3 interaction, the patient reports problems sleeping due to recurrent thoughts of long-term hospital stay, getting COVID-19, and  from his wife. He believes his treatment regimen was helping his anxiety before, but not today. . He attributes his drowsiness to Klonopin. Feels "bad for not answering the questions appropriately" during morning rounds. Reports good appetite.  Interested in going back home ASAP and applying for disability. Interested in attending groups. Has been social with other patients in the unit. Has regrets over admission, states "I feel worse in the hospital." Denies being depressed, side effects from medications, or suicidal or homicidal ideation.    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 She believes his sleep problems stem from drinking caffeinated drinks throughout the day.  She expresses concern that he will minimize his symptoms while in the hospital. She states she will visit today.     Left message for [facility], where the patient's outpatient provider (Dr. Ghotra) sees patient.

## 2020-11-23 NOTE — PROGRESS NOTE BEHAVIORAL HEALTH - NSBHCHARTREVIEWINVESTIGATE_PSY_A_CORE FT
< from: 12 Lead ECG (11.20.20 @ 17:20) >  Ventricular Rate 94 BPM  Atrial Rate 94 BPM  P-R Interval 138 ms  QRS Duration 86 ms  Q-T Interval 316 ms  QTC Calculation(Bazett) 395 ms  < end of copied text >

## 2020-11-23 NOTE — MEDICAL STUDENT PROGRESS NOTE(EDUCATION) - NS MD HP STUD ASPLAN ASSES FT
51 yo , domiciled, , employed male with a past psychiatric history of depression, suicide attempts and hospitalizations, and past medical history of hypertension and hyperlipidemia, presents on the unit with severe anxiety   and trouble sleeping. Patient states to no longer being depressed or having passive suicidal ideations. He thinks he is ready to go back home.

## 2020-11-23 NOTE — MEDICAL STUDENT PROGRESS NOTE(EDUCATION) - SUBJECTIVE AND OBJECTIVE BOX
CC: "I feel anxious"     Interval History: Patient was seen at beside. He reports problems sleeping due to recurrent thoughts of long-term hospital stay, getting COVID-19, and  from his wife.   He states his treatment regimen was helping his anxiety before but not today. No side effects to the medication were reported. He attributes his drowsiness to Klonopin. Feels "bad for not answering the questions appropriately" during morning rounds. Reports good appetite. Denies being depressed. Denies any SI/HI. Interested in going back home ASAP and applying for disability. Interested in attending groups. Has been social with other patients in the unit. Has regrets over admission, states "I feel worse in the hospital."   Has not had a panic attack since his last one during a zoom class. Attributes this panic attack to stress from changing work environment, election week, decreased sleep and increased caffeine intake. He does not think it will happen again as he thinks applying for disability will help. He is excited to see his wife and kids today.     He has two past suicide attempts; one in '98 and the other in '08. Both required immediate hospitalization. His first attempt, drinking half a cup of wind-shield wiper fluid, was due to recurrent thoughts of burdening his family with his mental illness. His second attempt, swallowing a bottle of pills, was due to feeling like a failure at his new job.

## 2020-11-23 NOTE — PROGRESS NOTE BEHAVIORAL HEALTH - SUMMARY
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    On evaluation today, the patient continues to appear depressed and anxious, although he is denying most symptoms at this time.     Anxiety/depression: (Meds were confirmed with the pharmacy [facility] at [facility], [phone] on 11/21/20)  - Continue Duloxetine 90mg daily  - Klonopin 1mg TID  - Increase Seroquel from 200 mg to 300 HS (home dose is 400mg)     - Vistaril 50 mg q6h PRN and Seroquel 25 mg for breakthrough anxiety.    #HTN   - Lisinopril 40mg qd.

## 2020-11-23 NOTE — CHART NOTE - NSCHARTNOTEFT_GEN_A_CORE
Social Work Admit Note:    Patient is 50 years of age male who was admitted for evaluation of anxiety.  At time of assessment in the emergency department patient endorsed “I lost it.”  Patient informed that he has been having daily panic attacks and depression.  Symptoms endorsed by patient included poor sleep, poor concentration and passive suicidal ideation.  He has been experiencing an inability to function normally.  Suicidal / homicidal ideation denied.  Audio / visual hallucinations denied.      In the community patient resides in a private residence with his spouse and two twin children age 12.  Patient is employed as a teacher.  Treatment history has been for depression.  Patient has history of two suicide attempts – by ingesting pills and drinking windshield wiper fluid.  Use of alcohol or other substances denied.  Patient has history of two prior inpatient psychiatric admissions with one in the 1990’s and the other in 2010.  He is seen by psychiatrist Dr. Ghotra of [facility] and a therapist Dr. Muñiz.  Patient’s mother is in a nursing home with Alzheimer’s.  Father of patient had history of alcohol use.      At baseline patient has a good relationship with both his spouse and children.  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      Sexual History – patient identifies as heterosexual. 	    Family relationships and history – Patient has two children.  He reports good relations with family members.      Leisure Activity Assessment – spending time with his spouse and children.         Community Supports – No known attendance in any self- help groups or other organizations.     Employment – patient employed as a teacher     Substance Use Assessment – use of alcohol or other substances denied.       History of suicidality or self- injurious behaviors – history of two suicide attempts.        Significant Loses – None identified.      Life Goals – patient verbalized goal of returning to work      will continue to meet with patient 1:1 and with treatment team daily.  Discharge plan is for continued mental health treatment in outpatient setting.      Please refer to Social Work Psychosocial for additional information.

## 2020-11-24 PROCEDURE — 99231 SBSQ HOSP IP/OBS SF/LOW 25: CPT

## 2020-11-24 RX ORDER — QUETIAPINE FUMARATE 200 MG/1
300 TABLET, FILM COATED ORAL AT BEDTIME
Refills: 0 | Status: DISCONTINUED | OUTPATIENT
Start: 2020-11-24 | End: 2020-11-24

## 2020-11-24 RX ORDER — CLONAZEPAM 1 MG
1 TABLET ORAL THREE TIMES A DAY
Refills: 0 | Status: DISCONTINUED | OUTPATIENT
Start: 2020-11-24 | End: 2020-11-27

## 2020-11-24 RX ORDER — QUETIAPINE FUMARATE 200 MG/1
400 TABLET, FILM COATED ORAL AT BEDTIME
Refills: 0 | Status: DISCONTINUED | OUTPATIENT
Start: 2020-11-24 | End: 2020-12-04

## 2020-11-24 RX ADMIN — DULOXETINE HYDROCHLORIDE 90 MILLIGRAM(S): 30 CAPSULE, DELAYED RELEASE ORAL at 08:03

## 2020-11-24 RX ADMIN — ATORVASTATIN CALCIUM 40 MILLIGRAM(S): 80 TABLET, FILM COATED ORAL at 20:02

## 2020-11-24 RX ADMIN — Medication 1 MILLIGRAM(S): at 08:03

## 2020-11-24 RX ADMIN — Medication 1 MILLIGRAM(S): at 20:02

## 2020-11-24 RX ADMIN — QUETIAPINE FUMARATE 400 MILLIGRAM(S): 200 TABLET, FILM COATED ORAL at 20:03

## 2020-11-24 RX ADMIN — Medication 1 MILLIGRAM(S): at 13:45

## 2020-11-24 RX ADMIN — LISINOPRIL 40 MILLIGRAM(S): 2.5 TABLET ORAL at 08:03

## 2020-11-24 NOTE — PROGRESS NOTE BEHAVIORAL HEALTH - NSBHFUPINTERVALHXFT_PSY_A_CORE
The patient was seen in treatment team meeting. The patient states that he slept "okay" after having his room changed. He reports going to groups, and having the groups help him remember coping skills to deal with anxiety, specifically box breathing techniques. And mindfulness. He expresses interest to go home particularly before the 30th so that he can keep his job. He states that he recognizes that he needs to use mindfulness techniques to avoid having anxiety attacks in the future. The patient said that he is not suicidal, homicidal, or experiencing extreme anxiety currently.    Per nursing, the patient slept well after his room change.     Per the patient's wife, the patient sounded better this morning, though somewhat confused on when he needs to return to work. She expressed that she is not worried about his safety at this time, and thinks he may benefit from being home with family for Thanksgiving if he were psychiatrically cleared. The patient was seen in treatment team meeting. The patient states that he slept "okay" after having his room changed. He reports going to groups, and having the groups help him remember coping skills to deal with anxiety, specifically box breathing techniques. And mindfulness. He expresses interest to go home particularly before the 30th so that he can keep his job. He states that he recognizes that he needs to use mindfulness techniques to avoid having anxiety attacks in the future. The patient said that he is not suicidal, homicidal, or experiencing extreme anxiety currently.    Per nursing, the patient slept well after his room change.     Per the patient's wife, the patient sounded better this morning, though somewhat confused on when he needs to return to work. She expressed that she is not worried about his safety at this time, and thinks he may benefit from being home with family for Thanksgiving if he were psychiatrically cleared.    Spoke with patient in afternoon. patient reports feeling better today than previous days. He states he is unsure about leaving the hospital tomorrow as he is concerned about his anxiety on leaving. The patient was seen in treatment team meeting. The patient states that he slept "okay" after having his room changed. He reports going to groups, and having the groups help him remember coping skills to deal with anxiety, specifically box breathing techniques and mindfulness. He expresses hope he will be ready for discharge before the 30th to avoid complications with his job. He states that he recognizes that he needs to use mindfulness techniques to avoid having anxiety attacks in the future. He denies suicidal or homicidal ideations, or experiencing extreme anxiety currently.    Per nursing, the patient slept well after his room change.     Per the patient's wife, the patient sounded better this morning, though somewhat confused on when he needs to return to work. She expressed that she is not worried about his safety at this time, and thinks he may benefit from being home with family for Thanksgiving if he were psychiatrically cleared.    Spoke with patient in afternoon. patient reports feeling better today than previous days. He states he is unsure about leaving the hospital tomorrow as he is concerned about his anxiety on leaving.

## 2020-11-24 NOTE — PROGRESS NOTE BEHAVIORAL HEALTH - RISK ASSESSMENT
Risk to self due to inability to function. Significant anxiety RIsk factors: significant social stressors, severe anxiety, male gender, age, history of suicide attempts  Protective factors: future oriented, stable relationships, supportive family, stable housing

## 2020-11-25 PROCEDURE — 99231 SBSQ HOSP IP/OBS SF/LOW 25: CPT

## 2020-11-25 RX ADMIN — QUETIAPINE FUMARATE 400 MILLIGRAM(S): 200 TABLET, FILM COATED ORAL at 20:12

## 2020-11-25 RX ADMIN — DULOXETINE HYDROCHLORIDE 90 MILLIGRAM(S): 30 CAPSULE, DELAYED RELEASE ORAL at 08:22

## 2020-11-25 RX ADMIN — Medication 1 MILLIGRAM(S): at 20:12

## 2020-11-25 RX ADMIN — ATORVASTATIN CALCIUM 40 MILLIGRAM(S): 80 TABLET, FILM COATED ORAL at 20:13

## 2020-11-25 RX ADMIN — LISINOPRIL 40 MILLIGRAM(S): 2.5 TABLET ORAL at 08:22

## 2020-11-25 NOTE — PROGRESS NOTE BEHAVIORAL HEALTH - SUMMARY
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    On evaluation today, the patient's overall clinical picture appears improved. He continues to state he has substantial anxiety about work, but denies suicidal or homicidal ideation, psychotic symptoms, or thoughts of self-harm. Continues to attend groups and be visible on the unit.     #MDD  - Continue Duloxetine 90mg daily  - Continue Seroquel 400 mg po HS tonight     #Anxiety d/o  - Vistaril 50 mg q6h PRN and Seroquel 25 mg for breakthrough anxiety.  - Klonopin 1 mg TID prn anxiety    #HTN   - Lisinopril 40mg qd.  - Monitor BP daily

## 2020-11-25 NOTE — PROGRESS NOTE BEHAVIORAL HEALTH - NSBHFUPINTERVALHXFT_PSY_A_CORE
Pt was seen, evaluated, chart reviewed.  As per nursing staff, no events overnight. On evaluation, pt reports that he is feeling "good." Pt is visible on the unit, attending groups and appears to be in good spirits. States he was able to sleep by taking Seroquel and Klonopin together. Is compliant with medication, denies negative side effects. Endorsed good sleep and appetite. 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 Denied auditory/visual hallucinations. Denied paranoia. Denied suicidal/homicidal ideation, intent or plan.    Physician spoke with Dr. Ghotra (outpatient psychiatrist) who reports that she last saw patient on Oct 14th and he was "fine." States that pt was working from home, has been doing weight watchers and lost some weight. Reports that pt called her after elections and endorsed some anxiety which led her to increase his Seroquel to 150 mg qhs and Klonopin 1 mg TID. States that he was on a higher dose of Seroquel in the past and once he "stabilizes" she has reduced the dose to avoid the potential side effects of HLD. Otherwise, she will take pt back once he is stable

## 2020-11-25 NOTE — CHART NOTE - NSCHARTNOTEFT_GEN_A_CORE
Social Work Note:    Treatment team met with patient to discuss treatment plan, medications and discharge plan.  At time of assessment patient reported improved mood.  Sleep and appetite were endorsed as good.  During the day patient has been observed to be visible on the unit.  He has been engaging with staff and peers appropriately.  Patient has also been attending and actively participating in groups.      Patient has been in contact with his spouse Mariya.  This worker spoke with patient’s spouse on Monday and yesterday.  Patient has been improving from admission but at times has some anxiety.      Prior to admission patient was in treatment at [facility] outpatient mental health clinic.  Plan is for patient to resume treatment there after his discharge.  Patient is agreeable to same.     Mental Status Exam:    Mood – Anxious   Sleep – Good   Appetite – Good   ADLs – Fair   Thought Process – Linear    Observation – [id number]    No barriers to discharge identified at this time.     At this time patient is not psychiatrically stable for discharge.
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
## 2020-11-25 NOTE — DISCHARGE NOTE BEHAVIORAL HEALTH - MEDICATION SUMMARY - MEDICATIONS TO TAKE
I will START or STAY ON the medications listed below when I get home from the hospital:    benazepril 40 mg oral tablet  -- 1 cap(s) by mouth once a day until stopped by MD  -- Indication: For Hypertension    clonazePAM 1 mg oral tablet  -- 1 tab(s) by mouth every 12 hours until stopped by MD  -- Indication: For Anxiety    DULoxetine 30 mg oral delayed release capsule  -- 3 cap(s) by mouth once a day until stopped by MD  -- Indication: For Mood/anxiety    atorvastatin 40 mg oral tablet  -- 1 tab(s) by mouth once a day (at bedtime) until stopped by MD  -- Indication: For Cholesterol    QUEtiapine 400 mg oral tablet  -- 1 tab(s) by mouth once a day (at bedtime) until stopped by MD  -- Indication: For thought disorder    QUEtiapine 50 mg oral tablet  -- 1 tab(s) by mouth in AM until stopped by MD  -- Indication: For thought disorder

## 2020-11-25 NOTE — PROGRESS NOTE BEHAVIORAL HEALTH - RISK ASSESSMENT
RIsk factors: significant social stressors, severe anxiety, male gender, age, history of suicide attempts  Protective factors: future oriented, stable relationships, supportive family, stable housing

## 2020-11-25 NOTE — DISCHARGE NOTE BEHAVIORAL HEALTH - NSBHDCSWCOMMENTSFT_PSY_A_CORE
Discharge Summary to [facility] [phone] on at Discharge Summary to [facility]-[phone] on Discharge Summary to [id number] on 12/4/20 at 2:45 pm

## 2020-11-25 NOTE — DISCHARGE NOTE BEHAVIORAL HEALTH - MEDICATION SUMMARY - MEDICATIONS TO CHANGE
I will SWITCH the dose or number of times a day I take the medications listed below when I get home from the hospital:    QUEtiapine 50 mg oral tablet

## 2020-11-26 RX ADMIN — QUETIAPINE FUMARATE 400 MILLIGRAM(S): 200 TABLET, FILM COATED ORAL at 20:04

## 2020-11-26 RX ADMIN — Medication 50 MILLIGRAM(S): at 09:47

## 2020-11-26 RX ADMIN — Medication 50 MILLIGRAM(S): at 16:52

## 2020-11-26 RX ADMIN — DULOXETINE HYDROCHLORIDE 90 MILLIGRAM(S): 30 CAPSULE, DELAYED RELEASE ORAL at 08:14

## 2020-11-26 RX ADMIN — Medication 1 MILLIGRAM(S): at 20:08

## 2020-11-26 RX ADMIN — LISINOPRIL 40 MILLIGRAM(S): 2.5 TABLET ORAL at 08:12

## 2020-11-26 RX ADMIN — ATORVASTATIN CALCIUM 40 MILLIGRAM(S): 80 TABLET, FILM COATED ORAL at 22:09

## 2020-11-27 PROCEDURE — 99232 SBSQ HOSP IP/OBS MODERATE 35: CPT | Mod: GC

## 2020-11-27 RX ORDER — CLONAZEPAM 1 MG
1 TABLET ORAL EVERY 12 HOURS
Refills: 0 | Status: DISCONTINUED | OUTPATIENT
Start: 2020-11-27 | End: 2020-12-04

## 2020-11-27 RX ADMIN — LISINOPRIL 40 MILLIGRAM(S): 2.5 TABLET ORAL at 08:04

## 2020-11-27 RX ADMIN — QUETIAPINE FUMARATE 400 MILLIGRAM(S): 200 TABLET, FILM COATED ORAL at 20:06

## 2020-11-27 RX ADMIN — Medication 50 MILLIGRAM(S): at 09:44

## 2020-11-27 RX ADMIN — DULOXETINE HYDROCHLORIDE 90 MILLIGRAM(S): 30 CAPSULE, DELAYED RELEASE ORAL at 08:04

## 2020-11-27 RX ADMIN — Medication 1 MILLIGRAM(S): at 20:06

## 2020-11-27 NOTE — PROGRESS NOTE BEHAVIORAL HEALTH - NSBHCHARTREVIEWVS_PSY_A_CORE FT
ICU Vital Signs Last 24 Hrs  T(C): 35.4 (25 Nov 2020 09:00), Max: 35.6 (25 Nov 2020 05:57)  T(F): 95.7 (25 Nov 2020 09:00), Max: 96 (25 Nov 2020 05:57)  HR: 100 (25 Nov 2020 09:00) (100 - 114)  BP: 117/73 (25 Nov 2020 09:00) (117/73 - 144/76)  BP(mean): --  ABP: --  ABP(mean): --  RR: 18 (25 Nov 2020 09:00) (16 - 18)  SpO2: --
Vital Signs Last 24 Hrs  T(C): 36.4 (21 Nov 2020 08:46), Max: 36.4 (20 Nov 2020 14:33)  T(F): 97.5 (21 Nov 2020 08:46), Max: 97.6 (20 Nov 2020 17:09)  HR: 85 (21 Nov 2020 08:46) (85 - 123)  BP: 149/97 (21 Nov 2020 08:46) (136/96 - 173/95)  BP(mean): --  RR: 18 (21 Nov 2020 08:46) (18 - 20)  SpO2: 98% (20 Nov 2020 20:04) (98% - 98%)
Vital Signs Last 24 Hrs  T(C): 36.6 (27 Nov 2020 07:46), Max: 36.6 (27 Nov 2020 07:46)  T(F): 97.9 (27 Nov 2020 07:46), Max: 97.9 (27 Nov 2020 07:46)  HR: 105 (27 Nov 2020 07:46) (96 - 109)  BP: 114/86 (27 Nov 2020 07:46) (114/86 - 151/83)  BP(mean): --  RR: 17 (27 Nov 2020 07:46) (17 - 20)  SpO2: --
Vital Signs Last 24 Hrs  T(C): 36.8 (23 Nov 2020 08:20), Max: 36.8 (23 Nov 2020 08:20)  T(F): 98.3 (23 Nov 2020 08:20), Max: 98.3 (23 Nov 2020 08:20)  HR: 91 (23 Nov 2020 08:20) (91 - 108)  BP: 124/84 (23 Nov 2020 08:20) (124/84 - 139/92)  BP(mean): --  RR: 18 (23 Nov 2020 08:20) (16 - 18)  SpO2: --
Vital Signs Last 24 Hrs  T(C): 36.9 (22 Nov 2020 08:32), Max: 36.9 (22 Nov 2020 08:32)  T(F): 98.4 (22 Nov 2020 08:32), Max: 98.4 (22 Nov 2020 08:32)  HR: 94 (22 Nov 2020 08:32) (94 - 100)  BP: 142/81 (22 Nov 2020 08:32) (103/64 - 142/81)  BP(mean): --  RR: 18 (22 Nov 2020 08:32) (18 - 18)  SpO2: --
Vital Signs Last 24 Hrs  T(C): 35.3 (24 Nov 2020 15:26), Max: 37.2 (24 Nov 2020 08:08)  T(F): 95.6 (24 Nov 2020 15:26), Max: 98.9 (24 Nov 2020 08:08)  HR: 114 (24 Nov 2020 15:26) (95 - 120)  BP: 136/76 (24 Nov 2020 15:26) (136/76 - 157/90)  BP(mean): --  RR: 18 (24 Nov 2020 15:26) (18 - 20)  SpO2: --

## 2020-11-27 NOTE — PROGRESS NOTE BEHAVIORAL HEALTH - SUMMARY
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    On evaluation today, the patient appears anxious and fearful about future panic attacks. He continues to state he has substantial anxiety about his wife leaving him, but denies suicidal or homicidal ideation, psychotic symptoms, or thoughts of self-harm. Continues to attend groups and be visible on the unit.     #Major Depressive Disorder  - Continue Duloxetine 90mg daily  - Continue Seroquel 400 mg po HS tonight     #Anxiety d/o  - Vistaril 50 mg q6h PRN and Seroquel 25 mg for breakthrough anxiety.  - Klonopin 1 mg q12h     #HTN   - Lisinopril 40mg qd.  - Monitor BP daily. 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    On evaluation today, the patient appears anxious and fearful about future panic attacks. He continues to state he has substantial anxiety about his wife leaving him, but denies suicidal or homicidal ideation, psychotic symptoms, or thoughts of self-harm. Continues to attend groups and be visible on the unit.     The patient's continued worsening confusion is concerning, as he is on a significant dosage of medications. The patient's anxiety with perseveration on leaving the unit and his wife leaving him, as well as inflexibility with change regarding work, politics, etc.    #Major Depressive Disorder  - Continue Duloxetine 90mg daily  - Continue Seroquel 400 mg po HS tonight     #Anxiety d/o  - Vistaril 50 mg q6h PRN and Seroquel 25 mg for breakthrough anxiety.  - Klonopin 1 mg q12h     #HTN   - Lisinopril 40mg qd.  - Monitor BP daily.    #Health Maintenance   - Check CBC/UA to r/o infectious cause as patient was admitted with white count with no follow up

## 2020-11-27 NOTE — PROGRESS NOTE BEHAVIORAL HEALTH - MODIFICATIONS
None
None
seen/discussed with resident.  Will continue to adjust meds to target pt's anxiety and ?thought disorder. No s/h ideation
None

## 2020-11-27 NOTE — PROGRESS NOTE BEHAVIORAL HEALTH - CASE SUMMARY
Pt was seen, evaluated and discussed with the resident, Dr. Ambriz. Chart reviewed. On evaluation, pt reports he is "better." Endorsed improved sleep however states he would rather go back to his home dose of Seroquel 400 mg qhs, understands that Seroquel has to be slowly titrated. Feels his anxiety has improved. Denied suicidal/homicidal ideation, intent or plan. Agree with assessment and plan above. Continue with medications as above.
Pt was seen, evaluated and discussed with the resident, Dr. Menendez. Chart reviewed. 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 On evaluation, pt reports that he did not have good sleep last night. Otherwise, expressed that his sleep is better. Agree with assessment and plan above. Continue with medications as above.
as noted
Pt was seen, evaluated and discussed with the resident, Dr. Menendez. Chart reviewed. On evaluation, pt reports that he continues to have a hard time sleeping. Otherwise, endorsed feeling "better." Pt is visible on the unit, attending groups. Compliant with medications. Agree with assessment and plan above. Continue with medications as above.

## 2020-11-27 NOTE — PROGRESS NOTE BEHAVIORAL HEALTH - NSBHFUPINTERVALHXFT_PSY_A_CORE
Pt seen and evaluated at bedside. He asks the team what the date is and how long he has been in the facility, stating its hard to keep track of the days as he "cannot even get a newspaper". Pt states he is exhausted because he had a panic attack last night that was so bad, he only got 3-4 hours of sleep. He informs that he attempted to call his wife and when she did not answer he believed their relationship was over which worsened his anxiety. Pt says he is "scared to death" about having panic attacks. He was counseled on asking for Vistaril when he feels an impending panic attack. He reports no issues with his medications. He denies any homicidal or suicidal ideation, intent or plan. He reports he needs more one on one therapy sessions. The patient was seen and evaluated at bedside during morning rounds. He asks the team what the date is and how long he has been in the facility, stating its hard to keep track of the days as he "cannot even get a newspaper" and feels like he has "been here since January." Pt states he is exhausted because he had a panic attack last night that was so bad, he only got 3-4 hours of sleep. He informs that he attempted to call his wife, and when she did not answer he believed their relationship was over which worsened his anxiety. Pt says he is "scared to death" about having panic attacks. He was counseled on asking for Vistaril when he feels an impending panic attack. He reports no issues with his medications. He denies any homicidal or suicidal ideation, intent or plan. He reports he needs more one on one therapy sessions.    Per nursing, the patient has been active on the unit, drinking coffee with meals, compliant with medication, and attending groups.     Per the patient's wife, the patient has been making calls to family about how she is leaving the patient. She states that she has no intention to leave the patient, and that she feels this paranoia is a new symptom for him. She is worried about a medical issue causing these symptoms.

## 2020-11-27 NOTE — CHART NOTE - NSCHARTNOTEFT_GEN_A_CORE
Social Work Note:    Treatment team met with patient to discuss treatment plan, medications and discharge plan.  At time of assessment patient reported having a panic attack yesterday.  Patient informed that when he attempted to contact his spouse and she did not answer the phone that he believed that their relationship was over.  From that experience patient had poor sleep when ruminating over these thoughts of his spouse leaving him.     Patient has been attending and actively participating in groups.  He was encouraged by treatment team to continue attending groups to learn coping skills to address his anxiety.  Patient was agreeable to same.      This worker spoke with patient’s spouse on Monday 11/23 and Wednesday 11/25.  Patient has been improving from admission but at times has some anxiety.      Prior to admission patient was in treatment at [facility].  Plan is for patient to resume treatment there after his discharge.  Patient is agreeable to same.     Mental Status Exam:    Mood – Anxious   Sleep – Poor   Appetite – Good   ADLs – Fair   Thought Process – Linear    Observation – [id number]    No barriers to discharge identified at this time.     At this time patient is not psychiatrically stable for discharge.

## 2020-11-28 LAB
A1C WITH ESTIMATED AVERAGE GLUCOSE RESULT: 4.7 % — SIGNIFICANT CHANGE UP (ref 4–5.6)
ESTIMATED AVERAGE GLUCOSE: 88 MG/DL — SIGNIFICANT CHANGE UP (ref 68–114)
HCT VFR BLD CALC: 46.1 % — SIGNIFICANT CHANGE UP (ref 42–52)
HGB BLD-MCNC: 16.2 G/DL — SIGNIFICANT CHANGE UP (ref 14–18)
MCHC RBC-ENTMCNC: 29.9 PG — SIGNIFICANT CHANGE UP (ref 27–31)
MCHC RBC-ENTMCNC: 35.1 G/DL — SIGNIFICANT CHANGE UP (ref 32–37)
MCV RBC AUTO: 85.1 FL — SIGNIFICANT CHANGE UP (ref 80–94)
NRBC # BLD: 0 /100 WBCS — SIGNIFICANT CHANGE UP (ref 0–0)
PLATELET # BLD AUTO: 215 K/UL — SIGNIFICANT CHANGE UP (ref 130–400)
RBC # BLD: 5.42 M/UL — SIGNIFICANT CHANGE UP (ref 4.7–6.1)
RBC # FLD: 12.9 % — SIGNIFICANT CHANGE UP (ref 11.5–14.5)
TSH SERPL-MCNC: 3.33 UIU/ML — SIGNIFICANT CHANGE UP (ref 0.27–4.2)
WBC # BLD: 14.32 K/UL — HIGH (ref 4.8–10.8)
WBC # FLD AUTO: 14.32 K/UL — HIGH (ref 4.8–10.8)

## 2020-11-28 RX ORDER — HALOPERIDOL DECANOATE 100 MG/ML
5 INJECTION INTRAMUSCULAR ONCE
Refills: 0 | Status: COMPLETED | OUTPATIENT
Start: 2020-11-28 | End: 2020-11-28

## 2020-11-28 RX ORDER — DIPHENHYDRAMINE HCL 50 MG
50 CAPSULE ORAL ONCE
Refills: 0 | Status: COMPLETED | OUTPATIENT
Start: 2020-11-28 | End: 2020-11-28

## 2020-11-28 RX ADMIN — LISINOPRIL 40 MILLIGRAM(S): 2.5 TABLET ORAL at 08:00

## 2020-11-28 RX ADMIN — QUETIAPINE FUMARATE 400 MILLIGRAM(S): 200 TABLET, FILM COATED ORAL at 20:08

## 2020-11-28 RX ADMIN — HALOPERIDOL DECANOATE 5 MILLIGRAM(S): 100 INJECTION INTRAMUSCULAR at 00:50

## 2020-11-28 RX ADMIN — Medication 1 MILLIGRAM(S): at 20:08

## 2020-11-28 RX ADMIN — ATORVASTATIN CALCIUM 40 MILLIGRAM(S): 80 TABLET, FILM COATED ORAL at 00:07

## 2020-11-28 RX ADMIN — Medication 50 MILLIGRAM(S): at 00:50

## 2020-11-28 RX ADMIN — Medication 1 MILLIGRAM(S): at 08:00

## 2020-11-28 RX ADMIN — DULOXETINE HYDROCHLORIDE 90 MILLIGRAM(S): 30 CAPSULE, DELAYED RELEASE ORAL at 08:00

## 2020-11-28 RX ADMIN — ATORVASTATIN CALCIUM 40 MILLIGRAM(S): 80 TABLET, FILM COATED ORAL at 22:56

## 2020-11-28 RX ADMIN — Medication 2 MILLIGRAM(S): at 00:50

## 2020-11-28 RX ADMIN — HALOPERIDOL DECANOATE 5 MILLIGRAM(S): 100 INJECTION INTRAMUSCULAR at 08:00

## 2020-11-28 NOTE — CHART NOTE - NSCHARTNOTEFT_GEN_A_CORE
Pt escalated quickly and became agitated and threatening towards the staff.   He knocked on the nursing station window, attempted to push himself inside the nursing station, started to scream that he needed to leave the unit at this time. Was not verbally redirectable.     Pt was placed in seclusion. Received Haldol/Ativan/Benadryl IM.

## 2020-11-29 RX ADMIN — LISINOPRIL 40 MILLIGRAM(S): 2.5 TABLET ORAL at 08:00

## 2020-11-29 RX ADMIN — DULOXETINE HYDROCHLORIDE 90 MILLIGRAM(S): 30 CAPSULE, DELAYED RELEASE ORAL at 08:00

## 2020-11-29 RX ADMIN — Medication 400 MILLIGRAM(S): at 12:27

## 2020-11-29 RX ADMIN — QUETIAPINE FUMARATE 25 MILLIGRAM(S): 200 TABLET, FILM COATED ORAL at 08:00

## 2020-11-29 RX ADMIN — Medication 1 MILLIGRAM(S): at 08:00

## 2020-11-29 RX ADMIN — Medication 400 MILLIGRAM(S): at 11:27

## 2020-11-29 RX ADMIN — Medication 1 MILLIGRAM(S): at 20:14

## 2020-11-29 RX ADMIN — ATORVASTATIN CALCIUM 40 MILLIGRAM(S): 80 TABLET, FILM COATED ORAL at 20:13

## 2020-11-29 RX ADMIN — QUETIAPINE FUMARATE 400 MILLIGRAM(S): 200 TABLET, FILM COATED ORAL at 20:13

## 2020-11-30 PROBLEM — E78.00 PURE HYPERCHOLESTEROLEMIA, UNSPECIFIED: Chronic | Status: ACTIVE | Noted: 2020-11-20

## 2020-11-30 PROBLEM — F41.9 ANXIETY DISORDER, UNSPECIFIED: Chronic | Status: ACTIVE | Noted: 2020-11-20

## 2020-11-30 PROBLEM — I10 ESSENTIAL (PRIMARY) HYPERTENSION: Chronic | Status: ACTIVE | Noted: 2020-11-20

## 2020-11-30 PROBLEM — F32.9 MAJOR DEPRESSIVE DISORDER, SINGLE EPISODE, UNSPECIFIED: Chronic | Status: ACTIVE | Noted: 2020-11-20

## 2020-11-30 PROCEDURE — 99232 SBSQ HOSP IP/OBS MODERATE 35: CPT

## 2020-11-30 PROCEDURE — 71101 X-RAY EXAM UNILAT RIBS/CHEST: CPT | Mod: 26,RT

## 2020-11-30 RX ADMIN — ATORVASTATIN CALCIUM 40 MILLIGRAM(S): 80 TABLET, FILM COATED ORAL at 20:05

## 2020-11-30 RX ADMIN — DULOXETINE HYDROCHLORIDE 90 MILLIGRAM(S): 30 CAPSULE, DELAYED RELEASE ORAL at 08:00

## 2020-11-30 RX ADMIN — Medication 1 MILLIGRAM(S): at 20:05

## 2020-11-30 RX ADMIN — Medication 400 MILLIGRAM(S): at 10:55

## 2020-11-30 RX ADMIN — Medication 1 MILLIGRAM(S): at 08:00

## 2020-11-30 RX ADMIN — QUETIAPINE FUMARATE 400 MILLIGRAM(S): 200 TABLET, FILM COATED ORAL at 20:05

## 2020-11-30 RX ADMIN — LISINOPRIL 40 MILLIGRAM(S): 2.5 TABLET ORAL at 08:00
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
## 2020-11-30 NOTE — CHART NOTE - NSCHARTNOTEFT_GEN_A_CORE
Social Work Note:    Patient was evaluated earlier today on unit rounds.  He informed that over the weekend he had a panic attack.  Patient has since been in good behavioral control and feeling better.  He has been in contact with his spouse and also had visitation with her over the weekend which went well per his report.  During the day patient has been observed to be visible on the unit, sitting in the day room and attending groups. He was encouraged to continue attending groups to learn coping skills to address his anxiety.  Patient was agreeable to same.      This worker spoke with patient’s spouse earlier today.  She informed that patient had been calling her at late hours of the night over the weekend.  Since that time she has spoken with patient who now sounds closer to baseline and more appropriate.      Prior to admission patient was in treatment at [place] [facility] health [facility].  Plan of referral to [facility] discussed.  He was agreeable to same.  Referral sent today.  This referral was reviewed with patient’s spouse who was familiar with this program from when patient attended some years ago.      Mental Status Exam:    Mood – Anxious   Sleep – Fair  Appetite – Good   ADLs – Fair   Thought Process – Linear    Observation – [id number]    No barriers to discharge identified at this time.     At this time patient is not psychiatrically stable for discharge.

## 2020-12-01 RX ORDER — QUETIAPINE FUMARATE 200 MG/1
50 TABLET, FILM COATED ORAL
Refills: 0 | Status: DISCONTINUED | OUTPATIENT
Start: 2020-12-02 | End: 2020-12-04

## 2020-12-01 RX ADMIN — QUETIAPINE FUMARATE 400 MILLIGRAM(S): 200 TABLET, FILM COATED ORAL at 20:15

## 2020-12-01 RX ADMIN — Medication 400 MILLIGRAM(S): at 14:58

## 2020-12-01 RX ADMIN — ATORVASTATIN CALCIUM 40 MILLIGRAM(S): 80 TABLET, FILM COATED ORAL at 20:15

## 2020-12-01 RX ADMIN — Medication 400 MILLIGRAM(S): at 14:47

## 2020-12-01 RX ADMIN — Medication 650 MILLIGRAM(S): at 12:04

## 2020-12-01 RX ADMIN — DULOXETINE HYDROCHLORIDE 90 MILLIGRAM(S): 30 CAPSULE, DELAYED RELEASE ORAL at 08:03

## 2020-12-01 RX ADMIN — Medication 1 MILLIGRAM(S): at 08:03

## 2020-12-01 RX ADMIN — Medication 650 MILLIGRAM(S): at 18:22

## 2020-12-01 RX ADMIN — Medication 650 MILLIGRAM(S): at 17:28

## 2020-12-01 RX ADMIN — Medication 1 MILLIGRAM(S): at 20:15

## 2020-12-01 RX ADMIN — LISINOPRIL 40 MILLIGRAM(S): 2.5 TABLET ORAL at 08:03

## 2020-12-01 RX ADMIN — Medication 400 MILLIGRAM(S): at 09:21

## 2020-12-01 RX ADMIN — Medication 400 MILLIGRAM(S): at 08:20

## 2020-12-02 LAB
GLUCOSE BLDC GLUCOMTR-MCNC: 144 MG/DL — HIGH (ref 70–99)
HCT VFR BLD CALC: 48.7 % — SIGNIFICANT CHANGE UP (ref 42–52)
HGB BLD-MCNC: 17.2 G/DL — SIGNIFICANT CHANGE UP (ref 14–18)
MCHC RBC-ENTMCNC: 29.9 PG — SIGNIFICANT CHANGE UP (ref 27–31)
MCHC RBC-ENTMCNC: 35.3 G/DL — SIGNIFICANT CHANGE UP (ref 32–37)
MCV RBC AUTO: 84.5 FL — SIGNIFICANT CHANGE UP (ref 80–94)
NRBC # BLD: 0 /100 WBCS — SIGNIFICANT CHANGE UP (ref 0–0)
PLATELET # BLD AUTO: 244 K/UL — SIGNIFICANT CHANGE UP (ref 130–400)
RBC # BLD: 5.76 M/UL — SIGNIFICANT CHANGE UP (ref 4.7–6.1)
RBC # FLD: 12.7 % — SIGNIFICANT CHANGE UP (ref 11.5–14.5)
WBC # BLD: 10.15 K/UL — SIGNIFICANT CHANGE UP (ref 4.8–10.8)
WBC # FLD AUTO: 10.15 K/UL — SIGNIFICANT CHANGE UP (ref 4.8–10.8)

## 2020-12-02 PROCEDURE — 99232 SBSQ HOSP IP/OBS MODERATE 35: CPT

## 2020-12-02 RX ADMIN — Medication 1 MILLIGRAM(S): at 20:14

## 2020-12-02 RX ADMIN — DULOXETINE HYDROCHLORIDE 90 MILLIGRAM(S): 30 CAPSULE, DELAYED RELEASE ORAL at 08:05

## 2020-12-02 RX ADMIN — ATORVASTATIN CALCIUM 40 MILLIGRAM(S): 80 TABLET, FILM COATED ORAL at 20:14

## 2020-12-02 RX ADMIN — Medication 400 MILLIGRAM(S): at 08:51

## 2020-12-02 RX ADMIN — QUETIAPINE FUMARATE 50 MILLIGRAM(S): 200 TABLET, FILM COATED ORAL at 08:04

## 2020-12-02 RX ADMIN — Medication 400 MILLIGRAM(S): at 09:51

## 2020-12-02 RX ADMIN — Medication 400 MILLIGRAM(S): at 20:52

## 2020-12-02 RX ADMIN — Medication 400 MILLIGRAM(S): at 20:11

## 2020-12-02 RX ADMIN — Medication 1 MILLIGRAM(S): at 08:05

## 2020-12-02 RX ADMIN — QUETIAPINE FUMARATE 400 MILLIGRAM(S): 200 TABLET, FILM COATED ORAL at 20:14

## 2020-12-02 RX ADMIN — LISINOPRIL 40 MILLIGRAM(S): 2.5 TABLET ORAL at 08:05

## 2020-12-02 RX ADMIN — Medication 400 MILLIGRAM(S): at 19:49

## 2020-12-02 RX ADMIN — QUETIAPINE FUMARATE 25 MILLIGRAM(S): 200 TABLET, FILM COATED ORAL at 21:56
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
## 2020-12-02 NOTE — CHART NOTE - NSCHARTNOTEFT_GEN_A_CORE
Social Work Note:    Patient was evaluated earlier today on unit rounds.  At time of assessment patient appeared to be anxious.  He has been experiencing some periods of the day where his anxiety has been elevated.  Patient continues to make references to his panic attack over this past weekend.      During the day patient has been observed to be visible on the unit, sitting in the day room and attending groups. He was encouraged to continue attending groups to learn coping skills to address his anxiety.  Patient was agreeable to same.      This worker spoke with patient’s spouse earlier this morning.  She maintains contact with patient via telephone and visits regularly.  Her last visit with patient was yesterday evening.      Prior to admission patient was in treatment at [place].  Plan of referral to [facility] discussed.  He was agreeable to same.  Referral sent on Monday 11/30.  This referral was reviewed with patient’s spouse who was familiar with this program from when patient attended some years ago.      Mental Status Exam:    Mood – Anxious   Sleep – Fair  Appetite – Good   ADLs – Fair   Thought Process – Linear    Observation – [id number]    No barriers to discharge identified at this time.     At this time patient is not psychiatrically stable for discharge.

## 2020-12-03 DIAGNOSIS — F41.1 GENERALIZED ANXIETY DISORDER: ICD-10-CM

## 2020-12-03 PROCEDURE — 99231 SBSQ HOSP IP/OBS SF/LOW 25: CPT

## 2020-12-03 RX ADMIN — QUETIAPINE FUMARATE 50 MILLIGRAM(S): 200 TABLET, FILM COATED ORAL at 08:00

## 2020-12-03 RX ADMIN — LISINOPRIL 40 MILLIGRAM(S): 2.5 TABLET ORAL at 08:00

## 2020-12-03 RX ADMIN — QUETIAPINE FUMARATE 400 MILLIGRAM(S): 200 TABLET, FILM COATED ORAL at 20:03

## 2020-12-03 RX ADMIN — Medication 50 MILLIGRAM(S): at 12:44

## 2020-12-03 RX ADMIN — DULOXETINE HYDROCHLORIDE 90 MILLIGRAM(S): 30 CAPSULE, DELAYED RELEASE ORAL at 08:00

## 2020-12-03 RX ADMIN — Medication 1 MILLIGRAM(S): at 08:00

## 2020-12-03 RX ADMIN — ATORVASTATIN CALCIUM 40 MILLIGRAM(S): 80 TABLET, FILM COATED ORAL at 20:03

## 2020-12-03 RX ADMIN — Medication 1 MILLIGRAM(S): at 20:04

## 2020-12-03 NOTE — PROGRESS NOTE BEHAVIORAL HEALTH - NSBHATTESTSEENBY_PSY_A_CORE
attending Psychiatrist without NP/Trainee
Attending Psychiatrist supervising NP/Trainee, meeting pt...
attending Psychiatrist without NP/Trainee
Attending Psychiatrist supervising NP/Trainee, meeting pt...
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
## 2020-12-03 NOTE — PROGRESS NOTE BEHAVIORAL HEALTH - BODY HABITUS
Obese
Obese/Well nourished
Well nourished/Obese
Obese
Well nourished/Obese
Obese
Obese/Well nourished
Well nourished
Well nourished

## 2020-12-03 NOTE — PROGRESS NOTE BEHAVIORAL HEALTH - PRIMARY DX
Moderate episode of recurrent major depressive disorder
Current severe episode of major depressive disorder without psychotic features without prior episode
Current severe episode of major depressive disorder without psychotic features without prior episode
Moderate episode of recurrent major depressive disorder
Current severe episode of major depressive disorder without psychotic features without prior episode
Moderate episode of recurrent major depressive disorder
Moderate episode of recurrent major depressive disorder
Anxiety
Moderate episode of recurrent major depressive disorder

## 2020-12-03 NOTE — PROGRESS NOTE BEHAVIORAL HEALTH - NSBHLEGALSTATUS_PSY_A_CORE
9.27 (2PC)
9.27 (2PC)
9.13 (Voluntary)

## 2020-12-03 NOTE — PROGRESS NOTE BEHAVIORAL HEALTH - NSBHADMITIPOBSFT_PSY_A_CORE
as clinically indicated
as clinically indicated
as indicated
unit routine
Per unit protocol

## 2020-12-03 NOTE — PROGRESS NOTE BEHAVIORAL HEALTH - AXIS III
htn, hypercholesterolemia

## 2020-12-04 VITALS
RESPIRATION RATE: 16 BRPM | TEMPERATURE: 97 F | DIASTOLIC BLOOD PRESSURE: 71 MMHG | SYSTOLIC BLOOD PRESSURE: 118 MMHG | HEART RATE: 114 BPM

## 2020-12-04 PROCEDURE — 99238 HOSP IP/OBS DSCHRG MGMT 30/<: CPT

## 2020-12-04 RX ORDER — CLONAZEPAM 1 MG
0 TABLET ORAL
Qty: 0 | Refills: 0 | DISCHARGE

## 2020-12-04 RX ORDER — DULOXETINE HYDROCHLORIDE 30 MG/1
0 CAPSULE, DELAYED RELEASE ORAL
Qty: 0 | Refills: 0 | DISCHARGE

## 2020-12-04 RX ORDER — CLONAZEPAM 1 MG
1 TABLET ORAL
Qty: 0 | Refills: 0 | DISCHARGE
Start: 2020-12-04

## 2020-12-04 RX ORDER — ATORVASTATIN CALCIUM 80 MG/1
1 TABLET, FILM COATED ORAL
Qty: 0 | Refills: 0 | DISCHARGE
Start: 2020-12-04

## 2020-12-04 RX ORDER — QUETIAPINE FUMARATE 200 MG/1
1 TABLET, FILM COATED ORAL
Qty: 0 | Refills: 0 | DISCHARGE
Start: 2020-12-04

## 2020-12-04 RX ORDER — BENAZEPRIL HYDROCHLORIDE 40 MG/1
0 TABLET ORAL
Qty: 0 | Refills: 0 | DISCHARGE

## 2020-12-04 RX ORDER — QUETIAPINE FUMARATE 200 MG/1
0 TABLET, FILM COATED ORAL
Qty: 0 | Refills: 0 | DISCHARGE

## 2020-12-04 RX ORDER — ATORVASTATIN CALCIUM 80 MG/1
0 TABLET, FILM COATED ORAL
Qty: 0 | Refills: 0 | DISCHARGE

## 2020-12-04 RX ORDER — DULOXETINE HYDROCHLORIDE 30 MG/1
3 CAPSULE, DELAYED RELEASE ORAL
Qty: 0 | Refills: 0 | DISCHARGE
Start: 2020-12-04

## 2020-12-04 RX ORDER — BENAZEPRIL HYDROCHLORIDE 40 MG/1
1 TABLET ORAL
Qty: 0 | Refills: 0 | DISCHARGE

## 2020-12-04 RX ADMIN — QUETIAPINE FUMARATE 50 MILLIGRAM(S): 200 TABLET, FILM COATED ORAL at 08:04

## 2020-12-04 RX ADMIN — LISINOPRIL 40 MILLIGRAM(S): 2.5 TABLET ORAL at 08:01

## 2020-12-04 RX ADMIN — DULOXETINE HYDROCHLORIDE 90 MILLIGRAM(S): 30 CAPSULE, DELAYED RELEASE ORAL at 08:01

## 2020-12-04 RX ADMIN — INFLUENZA VIRUS VACCINE 0.5 MILLILITER(S): 15; 15; 15; 15 SUSPENSION INTRAMUSCULAR at 09:55

## 2020-12-04 RX ADMIN — Medication 1 MILLIGRAM(S): at 08:05

## 2020-12-04 NOTE — CHART NOTE - NSCHARTNOTEFT_GEN_A_CORE
Pt. is for discharge on this date.  He does not present with any acute symptoms at this time.  Pt. denies any suicidal or homicidal ideation or any A/V hallucinations at this time.  Pt. has remained compliant with his medication and has been active and present on the unit.    Pt. will return to his home in the community.  He will be escorted home by his wife. Pt. will follow up with [facility] on 12/7.  He is aware and agreeable to this plan.  Pt. presents as stable for discharge on this date.

## 2020-12-04 NOTE — CHART NOTE - NSCHARTNOTEFT_GEN_A_CORE
pt is without psychosis; no s/h ideation.  Mood euthymic; pt stable for d/c home with f/u at day program on 12/7. Case reviewed with wife; no issues regarding plan

## 2020-12-07 DIAGNOSIS — F41.1 GENERALIZED ANXIETY DISORDER: ICD-10-CM

## 2020-12-07 DIAGNOSIS — E78.00 PURE HYPERCHOLESTEROLEMIA, UNSPECIFIED: ICD-10-CM

## 2020-12-07 DIAGNOSIS — F32.2 MAJOR DEPRESSIVE DISORDER, SINGLE EPISODE, SEVERE WITHOUT PSYCHOTIC FEATURES: ICD-10-CM

## 2020-12-07 DIAGNOSIS — I10 ESSENTIAL (PRIMARY) HYPERTENSION: ICD-10-CM

## 2020-12-07 DIAGNOSIS — F41.0 PANIC DISORDER [EPISODIC PAROXYSMAL ANXIETY]: ICD-10-CM

## 2020-12-31 ENCOUNTER — OUTPATIENT (OUTPATIENT)
Dept: OUTPATIENT SERVICES | Facility: HOSPITAL | Age: 50
LOS: 1 days | Discharge: HOME | End: 2020-12-31

## 2020-12-31 DIAGNOSIS — F41.1 GENERALIZED ANXIETY DISORDER: ICD-10-CM

## 2020-12-31 DIAGNOSIS — F33.1 MAJOR DEPRESSIVE DISORDER, RECURRENT, MODERATE: ICD-10-CM

## 2021-04-13 PROBLEM — Z00.00 ENCOUNTER FOR PREVENTIVE HEALTH EXAMINATION: Status: ACTIVE | Noted: 2021-04-13

## 2021-04-14 ENCOUNTER — APPOINTMENT (OUTPATIENT)
Dept: PULMONOLOGY | Facility: CLINIC | Age: 51
End: 2021-04-14
Payer: COMMERCIAL

## 2021-04-14 VITALS
OXYGEN SATURATION: 97 % | HEIGHT: 69 IN | DIASTOLIC BLOOD PRESSURE: 70 MMHG | HEART RATE: 94 BPM | WEIGHT: 215 LBS | BODY MASS INDEX: 31.84 KG/M2 | SYSTOLIC BLOOD PRESSURE: 118 MMHG | RESPIRATION RATE: 14 BRPM

## 2021-04-14 DIAGNOSIS — G47.33 OBSTRUCTIVE SLEEP APNEA (ADULT) (PEDIATRIC): ICD-10-CM

## 2021-04-14 PROCEDURE — 99213 OFFICE O/P EST LOW 20 MIN: CPT

## 2021-04-14 PROCEDURE — 99072 ADDL SUPL MATRL&STAF TM PHE: CPT

## 2021-04-14 NOTE — HISTORY OF PRESENT ILLNESS
[Follow-Up - Routine Clinic] : a routine clinic follow-up of [Excessive Sleepiness-Day] : no excessive daytime sleepiness [Witnessed Apnea] : no witnessed apnea during sleep [Witnessed Gasping] : no witnessed gasping during sleep [Snoring] : no snoring [Unrefreshing Sleep] : no unrefreshing sleep [Sleepy When Sedentary] : no sleepy when sedentary [Impaired Concentration] : no impaired concentration [CPAP] : CPAP [Good Compliance] : good compliance with treatment [Good Tolerance] : good tolerance of treatment [Good Symptom Control] : good symptom control

## 2021-05-13 ENCOUNTER — RESULT REVIEW (OUTPATIENT)
Age: 51
End: 2021-05-13

## 2021-08-02 NOTE — PROGRESS NOTE BEHAVIORAL HEALTH - THOUGHT CONTENT
Unremarkable Wartpeel Counseling:  I discussed with the patient the risks of Wartpeel including but not limited to erythema, scaling, itching, weeping, crusting, and pain.

## 2021-10-18 ENCOUNTER — APPOINTMENT (OUTPATIENT)
Dept: PULMONOLOGY | Facility: CLINIC | Age: 51
End: 2021-10-18

## 2022-01-03 ENCOUNTER — APPOINTMENT (OUTPATIENT)
Age: 52
End: 2022-01-03

## 2022-01-14 NOTE — PROGRESS NOTE BEHAVIORAL HEALTH - NSBHADMITIPREASON_PSY_A_CORE
Attending Attestation (For Attendings USE Only)...
Danger to self; mental illness expected to respond to inpatient care

## 2022-05-18 NOTE — PATIENT PROFILE BEHAVIORAL HEALTH - AS SC BRADEN ACTIVITY
Initial SW/CM Assessment/Plan of Care Note     Baseline Assessment  78 year old admitted 5/17/2022 as Observation with a diagnosis of syncope and hypotension.   Prior to admission patient was living with Spouse/significant other, Other (Comment) (has 24 hour CGs) and residing in a Condo.   Patient’s Primary Care Provider is Mattie Stroud MD.     Prior to Admission Status  Functional Status  Transportation: Friends, Other (comment) (CG)    Agency/Support Services  Type of Services Prior to Hospitalization: Family Caregiver (paid)  Agency Service Comments                              Support Systems: Spouse/Significant other, Other (Comment) (24 hour CGs)  Home Devices/Equipment: Shower chair, Blood glucose monitor, Blood pressure monitor, Elevated toilet seat  Mobility Assist Devices: Four wheel walker  Sensory Support Devices: Eyeglasses, Dentures, Hearing aids (Hearing aids not here)      Current Status  Physical Therapy: Recommends home with 24 hour non-skilled assistance.  Occupational Therapy: Recommends home with 24 hour non-skilled assistance and non-daily skilled therapy.        Insurance  Primary: MEDICARE  Secondary: [facility]      Progress Note  Assessment completed with the patient in the room. Followed by the PCP and Cardiology. I reviewed PT/OT notes and recommendations with the patient. He is agreeable to having [facility] services. A [facility] has been sent to [facility], orders for RN/PT/OT have been requested.   He is from home with his wife and 24 hr Cgs.     Plan  SW/CM - Recommendations for Discharge: Home care    Patient stated goal for discharge is: Home with his wife, CG, and [facility].         Refer to SW/CM Flowsheet for Goals and objective data.       
(3) walks occasionally

## 2022-08-30 NOTE — CHART NOTE - NSCHARTNOTEFT_GEN_A_CORE
Patient was interviewed for Wednesday didactic class. Throughout the interview, the patient was increasingly anxious; claiming he was having a "panic attack". States his "panic attacks" involve stuttering, faster speech and increased fidgeting. He attributes worsening anxiety to phone calls he made earlier today. Patient spoke with psychiatrist, wife and family members over the phone, today. Psychiatrist advised patient should be honest about his current condition and suggest more days of being on the unit. Afterwards, patient called wife to mention he was going to stay a few days longer. Patient states wife "blew up," threatened to leave him and told him to not call back. Later, patient dialed other family members, including sister, to explain his condition.     Afterwards, patient was seen in his room. Disclosed he "made up" feeling better during morning rounds because other patient's told him "that's how you get out earlier." Patient wants to learn new skills to cope with current condition, a combination of worsening anxiety and depressed mood. Pt states "this the worst I've ever been." Pt agreed to decrease caffeine consumption and get more sleep. Patient should be monitored for possible suicide ideations. Spoke with patient and she is having difficulty articulating her question.   She has a mammogram scheduled for 9/1/22 and what sounds like she is wondering if she will need to have it. I suggested that she follow through with her scheduled mammogram on 9/1.     She does want someone from Dr. Carias's office to touch base with her tomorrow if possible?   Patient was interviewed for Wednesday didactic class. Throughout the interview, the patient was increasingly anxious; claiming he was having a "panic attack". States his "panic attacks" involve stuttering, faster speech and increased fidgeting. He attributes worsening anxiety to phone calls he made earlier today. Patient spoke with psychiatrist, wife and family members over the phone, today. Psychiatrist advised patient should be honest about his current condition and suggest more days of being on the unit. Afterwards, patient called wife to mention he was going to stay a few days longer. Patient states wife "blew up," threatened to leave him and told him to not call back. Later, patient dialed other family members, including sister, to explain his condition.     Afterwards, patient was seen in his room. Disclosed he "made up" feeling better during morning rounds because other patient's told him "that's how you get out earlier." Patient wants to learn new skills to cope with current condition, a combination of worsening anxiety and depressed mood. Pt states "this the worst I've ever been." Denies SI/HI. Pt agreed to decrease caffeine consumption and get more sleep. Patient should be monitored for possible suicide ideations.

## 2023-01-26 NOTE — PROGRESS NOTE BEHAVIORAL HEALTH - SUMMARY
Pt to [facility] for lab draw, doctors office visit, and chemotherapy administration.      Afebrile.  VSS.  Awake and alert in no acute distress.      Port accessed using a 22g 3/4 inch trevino needle with 1 attempt by Raffaele P[facility] RN.  Labs drawn from the port without difficulty.   Pt tolerated well.      Chemotherapy dosage calculated independently by Mary Saravia, RN and Huong Foster RN and compared to road map for protocol [id number] (ON STUDY).  Calculations within 10% of written order.  Lab results reviewed.      Premedications and chemo given as ordered, see MAR.  Blood return verified prior to, and after chemotherapy infusion.  See Chemotherapy flowsheet.  PT tolerated well.  No side effects or complications noted.  Port flushed per orders (see MAR) and de-accessed after completion. PT home with Mother.  Will return for next visit on 1/26/23.      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      Anxiety/depression: (Meds were confirmed with the pharmacy [facility] at [facility], [phone] on 11/21/20)  - Continue Duloxetine 90mg daily  - Klonopin 1 mg TID, now PRN  - Increase Seroquel from 300 mg to 400 mg po HS tonight (home dose is 400mg)     - Vistaril 50 mg q6h PRN and Seroquel 25 mg for breakthrough anxiety.    #HTN   - Lisinopril 40mg qd. 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    On evaluation today, the patient's overall clinical picture appears improved. He continues to state he has substantial anxiety about work, but denies suicidal or homicidal ideation, psychotic symptoms, or thoughts of self-harm.       Anxiety/depression: (Meds were confirmed with the pharmacy [facility] at [facility], [phone] on 11/21/20)  - Continue Duloxetine 90mg daily  - Klonopin 1 mg TID, now PRN  - Increase Seroquel from 300 mg to 400 mg po HS tonight (home dose is 400mg)     - Vistaril 50 mg q6h PRN and Seroquel 25 mg for breakthrough anxiety.    #HTN   - Lisinopril 40mg qd. 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    On evaluation today, the patient's overall clinical picture appears improved. He continues to state he has substantial anxiety about work, but denies suicidal or homicidal ideation, psychotic symptoms, or thoughts of self-harm. Continues to attend groups and be visible on the unit.     #MICHAEL vs MDD  - Continue Duloxetine 90mg daily  - Klonopin 1 mg TID, now PRN  - Increase Seroquel from 300 mg to 400 mg po HS tonight (home dose is 400mg)     - Vistaril 50 mg q6h PRN and Seroquel 25 mg for breakthrough anxiety.    #HTN   - Lisinopril 40mg qd. 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    On evaluation today, the patient's overall clinical picture appears improved. He continues to state he has substantial anxiety about work, but denies suicidal or homicidal ideation, psychotic symptoms, or thoughts of self-harm. Continues to attend groups and be visible on the unit.     #MDD  - Continue Duloxetine 90mg daily  - Increase Seroquel from 300 mg to 400 mg po HS tonight (home dose is 400mg)     #Anxiety d/o  - Change to PRN (from standing)-Klonopin 1 mg TID PRN  - Vistaril 50 mg q6h PRN and Seroquel 25 mg for breakthrough anxiety.    #HTN   - Lisinopril 40mg qd  - Monitor BP daily

## 2023-02-13 NOTE — PATIENT PROFILE BEHAVIORAL HEALTH - NSBHIDEAT_PSY_A_CORE
Physical Therapy Treatment Note           Name: Anthony Ibarra    : 1934 (88 y.o.)  MRN: [id number]           TREATMENT SUMMARY AND RECOMMENDATIONS:    PT Received On: 23  PT Start Time: 1110     PT Stop Time: 1138  PT Total Time (min): 28 min     Subjective Assessment:   No complaints  Lethargic   x Awake, alert, cooperative  Uncooperative    Agitated x c/o pain    Appropriate  c/o fatigue    Confused x Treated at bedside     Emotionally labile  Treated in gym/dept.    Impulsive  Other:    Flat affect       Therapy Precautions:    Cognitive deficits  Spinal precautions    Collar - hard  Sternal precautions    Collar - soft   TLSO   x Fall risk  LSO    Hip precautions - posterior  Knee immobilizer    Hip precautions - anterior  WBAT    Impaired communication x Partial weightbearing LLE    Oxygen  TTWB    PEG tube  NWB    Visual deficits  Other:    Hearing deficits          Treatment Objectives:     Mobility Training:   Assist level Comments    Bed mobility MOD A Supine<>sit   Transfer MIN A  x 2 Stand pivot using RW bed>chair   Gait     Sit to stand transitions MIN A x 2 X 2 reps using RW   Sitting balance     Standing balance      Wheelchair mobility     Car transfer     Other:          Therapeutic Exercise:   Exercise Sets Reps Comments                               Additional Comments:  PT assisted OT in further tasks and getting supplies but did not treat during that time. PT assisted with initiation of tx and transfer completion.     Assessment: Patient tolerated session well.    PT Plan: Continue per POC  Revisions made to plan of care: No    GOALS:   Multidisciplinary Problems       Physical Therapy Goals          Problem: Physical Therapy    Goal Priority Disciplines Outcome Goal Variances Interventions   Physical Therapy Goal     PT, PT/OT Ongoing, Progressing     Description: Goals to be met by: Discharge    Patient will increase functional independence with mobility by  performin. Supine to sit with Modified [place]  2. Sit to supine with Modified [place]  3. Sit to stand transfer with Modified [place]  4. Bed to chair transfer with Modified [place] using Rolling Walker  5. Gait  x 100 feet with Supervision using Rolling Walker.                        Skilled PT Minutes Provided: 15   Communication with Treatment Team:     Equipment recommendations:       At end of treatment, patient remained:  x Up in chair     Up in wheelchair in room    In bed   x With alarm activated    Bed rails up   x Call bell in reach     Family/friends present    Restraints secured properly    In bathroom with CNA/RN notified   x Nurse aware    In gym with therapist/tech    Other:       absent

## 2023-08-29 NOTE — PROGRESS NOTE BEHAVIORAL HEALTH - NSBHFUPTYPE_PSY_A_CORE
Therapist called and LVM for patient. Advised to contact [facility] and ask them to contact [facility] ([employer]) and [facility] for \"official discharge\". [facility] clinician cannot see patient for approved visits until this is completed. Advised to call office for questions/concerns   Inpatient

## 2024-10-07 NOTE — PATIENT PROFILE BEHAVIORAL HEALTH - NSBHKNWAWOLRSK_PSY_A_CORE
Treatment Goal Explanation (Does Not Render In The Note): Stable for the purposes of categorizing medical decision making is defined by the specific treatment goals for an individual patient. A patient that is not at their treatment goal is not stable, even if the condition has not changed and there is no short- term threat to life or function. no
